# Patient Record
Sex: FEMALE | Race: BLACK OR AFRICAN AMERICAN | NOT HISPANIC OR LATINO | Employment: UNEMPLOYED | URBAN - METROPOLITAN AREA
[De-identification: names, ages, dates, MRNs, and addresses within clinical notes are randomized per-mention and may not be internally consistent; named-entity substitution may affect disease eponyms.]

---

## 2017-05-25 ENCOUNTER — ALLSCRIPTS OFFICE VISIT (OUTPATIENT)
Dept: OTHER | Facility: OTHER | Age: 32
End: 2017-05-25

## 2017-05-30 ENCOUNTER — LAB CONVERSION - ENCOUNTER (OUTPATIENT)
Dept: OTHER | Facility: OTHER | Age: 32
End: 2017-05-30

## 2017-05-30 LAB
A/G RATIO (HISTORICAL): 1.4 (CALC) (ref 1–2.5)
ALBUMIN SERPL BCP-MCNC: 4.2 G/DL (ref 3.6–5.1)
ALP SERPL-CCNC: 44 U/L (ref 33–115)
ALT SERPL W P-5'-P-CCNC: 14 U/L (ref 6–29)
AST SERPL W P-5'-P-CCNC: 16 U/L (ref 10–30)
BASOPHILS # BLD AUTO: 1.3 %
BASOPHILS # BLD AUTO: 48 CELLS/UL (ref 0–200)
BILIRUB SERPL-MCNC: 0.6 MG/DL (ref 0.2–1.2)
BUN SERPL-MCNC: 9 MG/DL (ref 7–25)
BUN/CREA RATIO (HISTORICAL): NORMAL (CALC) (ref 6–22)
CALCIUM SERPL-MCNC: 9.4 MG/DL (ref 8.6–10.2)
CHLORIDE SERPL-SCNC: 105 MMOL/L (ref 98–110)
CHOLEST SERPL-MCNC: 199 MG/DL (ref 125–200)
CHOLEST/HDLC SERPL: 2.3 (CALC)
CO2 SERPL-SCNC: 24 MMOL/L (ref 20–31)
CREAT SERPL-MCNC: 0.69 MG/DL (ref 0.5–1.1)
DEPRECATED RDW RBC AUTO: 14.1 % (ref 11–15)
EGFR AFRICAN AMERICAN (HISTORICAL): 135 ML/MIN/1.73M2
EGFR-AMERICAN CALC (HISTORICAL): 117 ML/MIN/1.73M2
EOSINOPHIL # BLD AUTO: 0.6 %
EOSINOPHIL # BLD AUTO: 22 CELLS/UL (ref 15–500)
GAMMA GLOBULIN (HISTORICAL): 2.9 G/DL (CALC) (ref 1.9–3.7)
GLUCOSE (HISTORICAL): 90 MG/DL (ref 65–99)
HCT VFR BLD AUTO: 35.5 % (ref 35–45)
HDLC SERPL-MCNC: 85 MG/DL
HGB BLD-MCNC: 11.5 G/DL (ref 11.7–15.5)
IRON SATN MFR SERPL: 16 % (CALC) (ref 11–50)
IRON SERPL-MCNC: 62 MCG/DL (ref 40–190)
LDL CHOLESTEROL (HISTORICAL): 105 MG/DL (CALC)
LYMPHOCYTES # BLD AUTO: 2105 CELLS/UL (ref 850–3900)
LYMPHOCYTES # BLD AUTO: 56.9 %
MCH RBC QN AUTO: 27.4 PG (ref 27–33)
MCHC RBC AUTO-ENTMCNC: 32.3 G/DL (ref 32–36)
MCV RBC AUTO: 84.8 FL (ref 80–100)
MONOCYTES # BLD AUTO: 181 CELLS/UL (ref 200–950)
MONOCYTES (HISTORICAL): 4.9 %
NEUTROPHILS # BLD AUTO: 1343 CELLS/UL (ref 1500–7800)
NEUTROPHILS # BLD AUTO: 36.3 %
NON-HDL-CHOL (CHOL-HDL) (HISTORICAL): 114 MG/DL (CALC)
PLATELET # BLD AUTO: 200 THOUSAND/UL (ref 140–400)
PMV BLD AUTO: 11 FL (ref 7.5–12.5)
POTASSIUM SERPL-SCNC: 3.8 MMOL/L (ref 3.5–5.3)
RBC # BLD AUTO: 4.19 MILLION/UL (ref 3.8–5.1)
SODIUM SERPL-SCNC: 140 MMOL/L (ref 135–146)
T3FREE SERPL-MCNC: 2.6 PG/ML (ref 2.3–4.2)
T4 FREE SERPL-MCNC: 0.9 NG/DL (ref 0.8–1.8)
TIBC SERPL-MCNC: 398 MCG/DL (CALC) (ref 250–450)
TOTAL PROTEIN (HISTORICAL): 7.1 G/DL (ref 6.1–8.1)
TRIGL SERPL-MCNC: 43 MG/DL
TSH SERPL DL<=0.05 MIU/L-ACNC: 1.2 MIU/L
VIT B12 SERPL-MCNC: 638 PG/ML (ref 200–1100)
WBC # BLD AUTO: 3.7 THOUSAND/UL (ref 3.8–10.8)

## 2017-06-02 ENCOUNTER — GENERIC CONVERSION - ENCOUNTER (OUTPATIENT)
Dept: OTHER | Facility: OTHER | Age: 32
End: 2017-06-02

## 2017-07-08 ENCOUNTER — HOSPITAL ENCOUNTER (EMERGENCY)
Facility: HOSPITAL | Age: 32
Discharge: HOME | End: 2017-07-08
Attending: EMERGENCY MEDICINE | Admitting: EMERGENCY MEDICINE
Payer: COMMERCIAL

## 2017-07-08 VITALS
TEMPERATURE: 97.9 F | OXYGEN SATURATION: 100 % | DIASTOLIC BLOOD PRESSURE: 80 MMHG | HEART RATE: 84 BPM | RESPIRATION RATE: 16 BRPM | SYSTOLIC BLOOD PRESSURE: 134 MMHG | WEIGHT: 143.96 LBS

## 2017-07-08 DIAGNOSIS — N39.0 URINARY TRACT INFECTION: Primary | ICD-10-CM

## 2017-07-08 LAB
CLARITY, POC: CLEAR
COLOR, POC: YELLOW
EXT BILIRUBIN, UA: NEGATIVE
EXT BLOOD URINE: NORMAL
EXT GLUCOSE, UA: NEGATIVE
EXT KETONES: NEGATIVE
EXT NITRITE, UA: NEGATIVE
EXT PH, UA: 6.5
EXT PROTEIN, UA: NEGATIVE
EXT SPECIFIC GRAVITY, UA: 1
EXT UROBILINOGEN: NEGATIVE
HCG UR QL: NEGATIVE
WBC # BLD EST: NEGATIVE 10*3/UL

## 2017-07-08 PROCEDURE — 99283 EMERGENCY DEPT VISIT LOW MDM: CPT

## 2017-07-08 PROCEDURE — 81002 URINALYSIS NONAUTO W/O SCOPE: CPT | Performed by: EMERGENCY MEDICINE

## 2017-07-08 PROCEDURE — 81025 URINE PREGNANCY TEST: CPT | Performed by: EMERGENCY MEDICINE

## 2017-07-08 RX ORDER — CEPHALEXIN 250 MG/1
500 CAPSULE ORAL ONCE
Status: COMPLETED | OUTPATIENT
Start: 2017-07-08 | End: 2017-07-08

## 2017-07-08 RX ORDER — CEPHALEXIN 500 MG/1
500 CAPSULE ORAL 4 TIMES DAILY
Qty: 28 CAPSULE | Refills: 0 | Status: SHIPPED | OUTPATIENT
Start: 2017-07-08 | End: 2017-07-15

## 2017-07-08 RX ADMIN — CEPHALEXIN 500 MG: 250 CAPSULE ORAL at 22:04

## 2017-08-29 ENCOUNTER — HOSPITAL ENCOUNTER (EMERGENCY)
Facility: HOSPITAL | Age: 32
Discharge: HOME | End: 2017-08-29
Attending: EMERGENCY MEDICINE | Admitting: EMERGENCY MEDICINE
Payer: COMMERCIAL

## 2017-08-29 VITALS
SYSTOLIC BLOOD PRESSURE: 140 MMHG | OXYGEN SATURATION: 100 % | DIASTOLIC BLOOD PRESSURE: 67 MMHG | HEART RATE: 85 BPM | WEIGHT: 145 LBS | RESPIRATION RATE: 14 BRPM | TEMPERATURE: 99.2 F

## 2017-08-29 DIAGNOSIS — N76.0 VAGINITIS: Primary | ICD-10-CM

## 2017-08-29 LAB
BACTERIA UR QL AUTO: ABNORMAL /HPF
BILIRUB UR QL STRIP: NEGATIVE
CLARITY UR: CLEAR
COLOR UR: ABNORMAL
GLUCOSE UR STRIP-MCNC: NEGATIVE MG/DL
HCG UR QL: NORMAL
HGB UR QL STRIP.AUTO: ABNORMAL
KETONES UR STRIP-MCNC: NEGATIVE MG/DL
LEUKOCYTE ESTERASE UR QL STRIP: ABNORMAL
NITRITE UR QL STRIP: NEGATIVE
NON-SQ EPI CELLS URNS QL MICRO: ABNORMAL /HPF
OTHER STN SPEC: ABNORMAL
PH UR STRIP.AUTO: 6 [PH] (ref 4.5–8)
PROT UR STRIP-MCNC: NEGATIVE MG/DL
RBC #/AREA URNS AUTO: ABNORMAL /HPF
SP GR UR STRIP.AUTO: 1.01 (ref 1–1.03)
UROBILINOGEN UR QL STRIP.AUTO: 0.2 E.U./DL
WBC #/AREA URNS AUTO: ABNORMAL /HPF

## 2017-08-29 PROCEDURE — 87086 URINE CULTURE/COLONY COUNT: CPT | Performed by: EMERGENCY MEDICINE

## 2017-08-29 PROCEDURE — 87591 N.GONORRHOEAE DNA AMP PROB: CPT | Performed by: EMERGENCY MEDICINE

## 2017-08-29 PROCEDURE — 99283 EMERGENCY DEPT VISIT LOW MDM: CPT

## 2017-08-29 PROCEDURE — 81001 URINALYSIS AUTO W/SCOPE: CPT | Performed by: EMERGENCY MEDICINE

## 2017-08-29 PROCEDURE — 87491 CHLMYD TRACH DNA AMP PROBE: CPT | Performed by: EMERGENCY MEDICINE

## 2017-08-29 PROCEDURE — 87147 CULTURE TYPE IMMUNOLOGIC: CPT | Performed by: EMERGENCY MEDICINE

## 2017-08-29 PROCEDURE — 81025 URINE PREGNANCY TEST: CPT | Performed by: EMERGENCY MEDICINE

## 2017-08-29 RX ORDER — METRONIDAZOLE 500 MG/1
500 TABLET ORAL EVERY 8 HOURS SCHEDULED
Qty: 21 TABLET | Refills: 0 | Status: SHIPPED | OUTPATIENT
Start: 2017-08-29 | End: 2017-09-05

## 2017-08-29 RX ORDER — FLUCONAZOLE 100 MG/1
200 TABLET ORAL ONCE
Status: COMPLETED | OUTPATIENT
Start: 2017-08-29 | End: 2017-08-29

## 2017-08-29 RX ORDER — FLUCONAZOLE 150 MG/1
TABLET ORAL
Qty: 7 TABLET | Refills: 0 | Status: SHIPPED | OUTPATIENT
Start: 2017-08-29 | End: 2017-10-13

## 2017-08-29 RX ADMIN — FLUCONAZOLE 200 MG: 100 TABLET ORAL at 17:07

## 2017-08-30 LAB
BACTERIA UR CULT: NORMAL
CHLAMYDIA DNA CVX QL NAA+PROBE: NORMAL
N GONORRHOEA DNA GENITAL QL NAA+PROBE: NORMAL

## 2017-09-13 ENCOUNTER — GENERIC CONVERSION - ENCOUNTER (OUTPATIENT)
Dept: OTHER | Facility: OTHER | Age: 32
End: 2017-09-13

## 2017-10-13 ENCOUNTER — HOSPITAL ENCOUNTER (EMERGENCY)
Facility: HOSPITAL | Age: 32
Discharge: HOME | End: 2017-10-13
Attending: EMERGENCY MEDICINE
Payer: COMMERCIAL

## 2017-10-13 VITALS
RESPIRATION RATE: 18 BRPM | HEART RATE: 80 BPM | SYSTOLIC BLOOD PRESSURE: 122 MMHG | OXYGEN SATURATION: 100 % | TEMPERATURE: 98.9 F | WEIGHT: 150.35 LBS | DIASTOLIC BLOOD PRESSURE: 72 MMHG

## 2017-10-13 DIAGNOSIS — B96.89 BACTERIAL VAGINOSIS: Primary | ICD-10-CM

## 2017-10-13 DIAGNOSIS — N76.0 BACTERIAL VAGINOSIS: Primary | ICD-10-CM

## 2017-10-13 LAB
CHLAMYDIA DNA CVX QL NAA+PROBE: NORMAL
CLARITY, POC: CLEAR
COLOR, POC: YELLOW
EXT BILIRUBIN, UA: NORMAL
EXT BLOOD URINE: NORMAL
EXT GLUCOSE, UA: NORMAL
EXT KETONES: NORMAL
EXT NITRITE, UA: NORMAL
EXT PH, UA: 7
EXT PREG TEST URINE: NORMAL
EXT PROTEIN, UA: NORMAL
EXT SPECIFIC GRAVITY, UA: 1.01
EXT UROBILINOGEN: NORMAL
N GONORRHOEA DNA GENITAL QL NAA+PROBE: NORMAL
WBC # BLD EST: NORMAL 10*3/UL

## 2017-10-13 PROCEDURE — 87491 CHLMYD TRACH DNA AMP PROBE: CPT | Performed by: EMERGENCY MEDICINE

## 2017-10-13 PROCEDURE — 81025 URINE PREGNANCY TEST: CPT | Performed by: EMERGENCY MEDICINE

## 2017-10-13 PROCEDURE — 99283 EMERGENCY DEPT VISIT LOW MDM: CPT

## 2017-10-13 PROCEDURE — 81002 URINALYSIS NONAUTO W/O SCOPE: CPT | Performed by: EMERGENCY MEDICINE

## 2017-10-13 PROCEDURE — 87591 N.GONORRHOEAE DNA AMP PROB: CPT | Performed by: EMERGENCY MEDICINE

## 2017-10-13 RX ORDER — METRONIDAZOLE 500 MG/1
500 TABLET ORAL 2 TIMES DAILY
Qty: 14 TABLET | Refills: 0 | Status: SHIPPED | OUTPATIENT
Start: 2017-10-13 | End: 2017-10-20

## 2017-10-13 NOTE — DISCHARGE INSTRUCTIONS
Bacterial Vaginosis   WHAT YOU NEED TO KNOW:   Bacterial vaginosis (BV) is an infection in the vagina  It may cause vaginitis, which is irritation and inflammation of the vagina  DISCHARGE INSTRUCTIONS:   Medicines:   · Antibiotics: These are given to kill the bacteria that cause BV  They may be given as a pill or a cream to put in your vagina  Take or use as directed  · Take your medicine as directed  Contact your healthcare provider if you think your medicine is not helping or if you have side effects  Tell him of her if you are allergic to any medicine  Keep a list of the medicines, vitamins, and herbs you take  Include the amounts, and when and why you take them  Bring the list or the pill bottles to follow-up visits  Carry your medicine list with you in case of an emergency  Prevent bacterial vaginosis:   · Keep your vaginal area clean and dry:  Wear underwear and pantyhose with a cotton crotch  Wipe from front to back after you urinate or have a bowel movement  After bathing, rinse soap from your vaginal area to decrease your risk for irritation  · Do not use products that cause irritation:  Always use unscented tampons or sanitary pads  Do not use feminine sprays, powders, or scented tampons because they may cause irritation and increase your risk of BV  Detergents and fabric softeners may also cause irritation  · Do not douche: This can cause an imbalance in healthy vaginal bacteria  · Use latex condoms: This helps prevent another infection and keeps your partner from getting the infection  Contact your healthcare provider if:   · Your symptoms come back or do not improve with treatment  · You have vaginal bleeding that is not your monthly period  · You have questions or concerns about your condition or care  © 2017 [facility] Information is for End User's use only and may not be sold, redistributed or otherwise used for commercial purposes   All illustrations and images included in CareNotes® are the copyrighted property of A D [employer] M [employer]  or [employer]dras Brambila  The above information is an  only  It is not intended as medical advice for individual conditions or treatments  Talk to your doctor, nurse or pharmacist before following any medical regimen to see if it is safe and effective for you

## 2017-10-13 NOTE — ED PROVIDER NOTES
History  Chief Complaint   Patient presents with    Vaginal Discharge     Pt  with vaginal discharge and pain  Pt  with history of BV  Onset two days ago     This is a 28-year-old female presents emergency department vaginal discharge and some burning  This has been over the past several days  Patient had similar symptoms approximately 1 month ago  At that time she had vaginal cultures performed and they were negative for gonorrhea and chlamydia  Patient was diagnosed with bacterial vaginosis  She is placed on metronidazole  She had good resolution of symptoms  Symptoms have returned  Patient is 3 days late for her menses  No pelvic pain  No fevers or chills  No nausea vomiting  Symptoms are moderate severity  No aggravating or alleviating factors  No new sexual partners  No radiation of symptoms  Differential diagnosis includes bacterial vaginosis, yeast infection, cervicitis, PID, pregnancy  None       Past Medical History:   Diagnosis Date    Migraine        Past Surgical History:   Procedure Laterality Date    AUGMENTATION BREAST Bilateral 2011, 2012    TONSILLECTOMY         History reviewed  No pertinent family history  I have reviewed and agree with the history as documented  Social History   Substance Use Topics    Smoking status: Never Smoker    Smokeless tobacco: Never Used    Alcohol use No        Review of Systems   Constitutional: Negative for activity change, appetite change and fever  HENT: Negative for congestion, ear pain, rhinorrhea and sore throat  Eyes: Negative for pain, discharge and redness  Respiratory: Negative for cough, shortness of breath and wheezing  Cardiovascular: Negative for chest pain and palpitations  Gastrointestinal: Negative for abdominal pain, diarrhea, nausea and vomiting  Endocrine: Negative for polyuria  Genitourinary: Positive for menstrual problem and vaginal discharge   Negative for difficulty urinating, dysuria, frequency, genital sores, hematuria, pelvic pain, urgency, vaginal bleeding and vaginal pain  Musculoskeletal: Negative for arthralgias and myalgias  Skin: Negative for color change and rash  Allergic/Immunologic: Negative for immunocompromised state  Neurological: Negative for dizziness, syncope and light-headedness  Hematological: Does not bruise/bleed easily  Psychiatric/Behavioral: Negative for confusion  Physical Exam  ED Triage Vitals [10/13/17 0722]   Temperature Pulse Respirations Blood Pressure SpO2   98 9 °F (37 2 °C) 82 18 128/75 100 %      Temp Source Heart Rate Source Patient Position - Orthostatic VS BP Location FiO2 (%)   Oral Monitor Lying Right arm --      Pain Score       2           Physical Exam   Constitutional: She is oriented to person, place, and time  She appears well-developed  No distress  HENT:   Head: Normocephalic and atraumatic  Nose: Nose normal    Eyes: Conjunctivae are normal  No scleral icterus  Neck: Normal range of motion  Neck supple  Cardiovascular: Normal rate, regular rhythm, normal heart sounds and intact distal pulses  Pulmonary/Chest: Effort normal and breath sounds normal  No stridor  No respiratory distress  She has no wheezes  Abdominal: Soft  She exhibits no distension  There is no tenderness  There is no rebound and no guarding  Genitourinary: Uterus normal  Vaginal discharge (Send vaginal discharge noted  No cervical motion tenderness  Mild tenderness on right adnexal exam however there is no fullness ) found  Genitourinary Comments: Cultures were obtained  Musculoskeletal: She exhibits no edema or deformity  Neurological: She is alert and oriented to person, place, and time  Skin: Skin is warm and dry  No rash noted  Psychiatric: She has a normal mood and affect  Thought content normal    Nursing note and vitals reviewed        ED Medications  Medications - No data to display    Diagnostic Studies  Labs Reviewed   POCT URINALYSIS DIPSTICK - Normal       Result Value Ref Range Status    Color, UA yellow   Final    Clarity, UA clear   Final    EXT Glucose, UA neg   Final    EXT Bilirubin, UA (Ref: Negative) neg   Final    EXT Ketones, UA (Ref: Negative) neg   Final    EXT Spec Grav, UA 1 015   Final    EXT Blood, UA (Ref: Negative) trace   Final    EXT pH, UA 7   Final    EXT Protein, UA (Ref: Negative) neg   Final    EXT Urobilinogen, UA (Ref: 0 2- 1 0) neg   Final    EXT Leukocytes, UA (Ref: Negative) neg   Final    EXT Nitrite, UA (Ref: Negative) neg   Final   POCT PREGNANCY, URINE - Normal    EXT PREG TEST UR (Ref: Negative) neg   Final   CHLAMYDIA /GC AMPLIFIED DNA       No orders to display       Procedures  Procedures      Phone Contacts  ED Phone Contact    ED Course  ED Course                                MDM    CritCare Time    Disposition  Final diagnoses:   Bacterial vaginosis     ED Disposition     ED Disposition Condition Comment    Discharge  Flor Teixeira discharge to home/self care  Condition at discharge: Stable        Follow-up Information     Follow up With Specialties Details Why Contact Info    Your OB/Gyn  In 1 week as scheduled         Patient's Medications   Discharge Prescriptions    METRONIDAZOLE (FLAGYL) 500 MG TABLET    Take 1 tablet by mouth 2 (two) times a day for 7 days       Start Date: 10/13/2017End Date: 10/20/2017       Order Dose: 500 mg       Quantity: 14 tablet    Refills: 0     No discharge procedures on file      ED Provider  Electronically Signed by       Fernanda Buchanan MD  10/13/17 0540       Fernanda Buchanan MD  10/13/17 0207

## 2017-10-27 ENCOUNTER — APPOINTMENT (EMERGENCY)
Dept: RADIOLOGY | Facility: HOSPITAL | Age: 32
End: 2017-10-27
Payer: COMMERCIAL

## 2017-10-27 ENCOUNTER — HOSPITAL ENCOUNTER (EMERGENCY)
Facility: HOSPITAL | Age: 32
Discharge: HOME | End: 2017-10-27
Attending: EMERGENCY MEDICINE | Admitting: EMERGENCY MEDICINE
Payer: COMMERCIAL

## 2017-10-27 VITALS
BODY MASS INDEX: 21.43 KG/M2 | OXYGEN SATURATION: 100 % | WEIGHT: 149.69 LBS | TEMPERATURE: 98.4 F | SYSTOLIC BLOOD PRESSURE: 131 MMHG | RESPIRATION RATE: 16 BRPM | DIASTOLIC BLOOD PRESSURE: 81 MMHG | HEART RATE: 60 BPM | HEIGHT: 70 IN

## 2017-10-27 DIAGNOSIS — R07.89 ATYPICAL CHEST PAIN: Primary | ICD-10-CM

## 2017-10-27 PROCEDURE — 99285 EMERGENCY DEPT VISIT HI MDM: CPT

## 2017-10-27 PROCEDURE — 71020 HB CHEST X-RAY 2VW FRONTAL&LATL: CPT

## 2017-10-27 PROCEDURE — 93005 ELECTROCARDIOGRAM TRACING: CPT | Performed by: PHYSICIAN ASSISTANT

## 2017-10-28 NOTE — ED NOTES
Pt stated "the pain in her chest is worse when she inhales at times"       [place]tf [place], JORGE  10/27/17 2123

## 2017-10-28 NOTE — ED NOTES
Pt stated "the pain started with a headache then she started to have LUQ pain which radiated up to her chest and back"        Matt Bee RN  10/27/17 2019

## 2017-10-28 NOTE — DISCHARGE INSTRUCTIONS
Noncardiac Chest Pain   WHAT YOU NEED TO KNOW:   Noncardiac chest pain is pain or discomfort in your chest not caused by a heart problem  It may be caused by acid reflux, nerve or muscle problems within your esophagus, or chest wall, muscle, or rib pain  It may also be caused by panic attacks, anxiety, or depression  DISCHARGE INSTRUCTIONS:   Return to the emergency department if:   · You have severe chest pain  Contact your healthcare provider if:   · Your chest pain does not get better, even with treatment  · You have questions or concerns about your condition or care  Medicines:   · Medicines  may be given to treat the cause of your chest pain  You may be given medicines to decrease pain, relieve anxiety, decrease acid reflux, or relax muscles in your esophagus  · Take your medicine as directed  Contact your healthcare provider if you think your medicine is not helping or if you have side effects  Tell him of her if you are allergic to any medicine  Keep a list of the medicines, vitamins, and herbs you take  Include the amounts, and when and why you take them  Bring the list or the pill bottles to follow-up visits  Carry your medicine list with you in case of an emergency  Cognitive therapy:  Cognitive therapy may be helpful if you have panic attacks, anxiety, or depression  It can help you change how you react to situations that tend to trigger your chest pain  Follow up with your healthcare provider as directed:  Write down your questions so you remember to ask them during your visits  © 2017 [address] Information is for End User's use only and may not be sold, redistributed or otherwise used for commercial purposes  All illustrations and images included in CareNotes® are the copyrighted property of A [employer] A M [employer]  or Esdras Brambila  The above information is an  only  It is not intended as medical advice for individual conditions or treatments   Talk to your doctor, nurse or pharmacist before following any medical regimen to see if it is safe and effective for you

## 2017-10-28 NOTE — ED PROVIDER NOTES
History  Chief Complaint   Patient presents with    Chest Pain     Pt presents to ER w/ c/o Left sided c/p approx 1 hr  Pt denied cardiac hx  Pt stated pain radiates into back  Pt confirmed n  Pt denied addtl symptoms     71-year-old female presents to emergency room for evaluation of left-sided chest pain  Onset around 5:00 p m  while watching TV  Pain is described as sharp worse with inspiration and radiates to the left side of her back  Denies shortness of breath  Denies sweating or nausea  Denies shoulder or arm pain  Denies neck pain  Denies drug abuse  Denies history of high blood pressure cholesterol or diabetes  Denies significant family history of cardiac disease  Denies recent travel, surgery or use of oral contraceptives  Denies swelling of her lower legs  Patient admits to recently starting to work out again denies lifting weights states she is just doing cardio  Patient states pain does not feel muscular  Denies recent cough cold or fever  Patient admits to typical migraine earlier in the day for which she took Excedrin and it helped  History provided by:  Patient      None       Past Medical History:   Diagnosis Date    Migraine        Past Surgical History:   Procedure Laterality Date    AUGMENTATION BREAST Bilateral 2011, 2012    TONSILLECTOMY         History reviewed  No pertinent family history  I have reviewed and agree with the history as documented  Social History   Substance Use Topics    Smoking status: Never Smoker    Smokeless tobacco: Never Used    Alcohol use No        Review of Systems   Constitutional: Negative for chills, diaphoresis and fever  Respiratory: Negative for cough  Cardiovascular: Positive for chest pain  Negative for palpitations and leg swelling  Gastrointestinal: Negative for abdominal pain and vomiting  Musculoskeletal: Positive for back pain  Negative for neck pain  Skin: Negative for rash and wound     Neurological: Negative for dizziness, syncope, weakness and numbness  Physical Exam  ED Triage Vitals [10/27/17 2003]   Temperature Pulse Respirations Blood Pressure SpO2   98 4 °F (36 9 °C) 72 16 146/89 100 %      Temp Source Heart Rate Source Patient Position - Orthostatic VS BP Location FiO2 (%)   Oral Monitor Sitting Right arm --      Pain Score       4           Orthostatic Vital Signs  Vitals:    10/27/17 2003 10/27/17 2121   BP: 146/89 140/92   Pulse: 72 72   Patient Position - Orthostatic VS: Sitting Lying       Physical Exam   Constitutional: She appears well-developed and well-nourished  Eyes: Conjunctivae are normal    Neck: Neck supple  Cardiovascular: Normal rate, regular rhythm and normal heart sounds  Pulmonary/Chest: Effort normal and breath sounds normal    Abdominal: Soft  Bowel sounds are normal  She exhibits no distension  There is no tenderness  There is no CVA tenderness  Musculoskeletal: She exhibits no edema or tenderness  Neurological: She is alert  Skin: Skin is warm and dry  No rash noted  Psychiatric: She has a normal mood and affect  Nursing note and vitals reviewed        ED Medications  Medications - No data to display    Diagnostic Studies  Results Reviewed     None                 XR chest 2 views   ED Interpretation by Sreekanth Galindo PA-C (10/27 2134)   No acute disease                 Procedures  ECG 12 Lead Documentation  Date/Time: 10/27/2017 9:42 PM  Performed by: Monica Krishnan by: JIMMY DUCKWORTH     Indications / Diagnosis:  Chest pain  ECG reviewed by me, the ED Provider: yes    Patient location:  ED  Interpretation:     Interpretation: normal    Rate:     ECG rate:  84    ECG rate assessment: normal    Rhythm:     Rhythm: sinus rhythm      Rhythm comment:  With Sinus arrhythmia   Ectopy:     Ectopy: none    QRS:     QRS axis:  Normal  Conduction:     Conduction: abnormal      Abnormal conduction: incomplete RBBB    ST segments:     ST segments:  Normal  T waves:     T waves: normal             Phone Contacts  ED Phone Contact    ED Course  ED Course                PERC Rule for PE    Flowsheet Row Most Recent Value   PERC Rule for PE   Age >=50  0 Filed at: 10/27/2017 2104   HR >=100  0 Filed at: 10/27/2017 2104   O2 Sat on room air < 95%  0 Filed at: 10/27/2017 2104   History of PE or DVT  0 Filed at: 10/27/2017 2104   Recent trauma or surgery  0 Filed at: 10/27/2017 2104   Hemoptysis  0 Filed at: 10/27/2017 2104   Exogenous estrogen  0 Filed at: 10/27/2017 2104   Unilateral leg swelling  0 Filed at: 10/27/2017 2104   PERC Rule for PE Results  0 Filed at: 10/27/2017 2104                Wells' Criteria for PE    Flowsheet Row Most Recent Value   Wells' Criteria for PE   Clinical signs and symptoms of DVT  0 Filed at: 10/27/2017 2104   PE is primary diagnosis or equally likely  0 Filed at: 10/27/2017 2104   HR >100  0 Filed at: 10/27/2017 2104   Immobilization at least 3 days or Surgery in the previous 4 weeks  0 Filed at: 10/27/2017 2104   Previous, objectively diagnosed PE or DVT  0 Filed at: 10/27/2017 2104   Hemoptysis  0 Filed at: 10/27/2017 2104   Malignancy with treatment within 6 months or palliative  0 Filed at: 10/27/2017 2104   Wells' Criteria Total  0 Filed at: 10/27/2017 2104            [facility]  Number of Diagnoses or Management Options  Atypical chest pain: new and requires workup     Amount and/or Complexity of Data Reviewed  Tests in the radiology section of CPT®: ordered and reviewed    Risk of Complications, Morbidity, and/or Mortality  Presenting problems: high  General comments: Differential diagnosis includes but is not limited to: arrhythmia, pnx, muscular, anxiety, GI, shingles, very low risk PE    Patient Progress  Patient progress: stable    CritCare Time    Disposition  Final diagnoses:   Atypical chest pain     Time reflects when diagnosis was documented in both MDM as applicable and the Disposition within this note     Time User Action Codes Description Comment    10/27/2017  9:36 PM Lisseth Florian Add [R07 [id number]] Atypical chest pain       ED Disposition     ED Disposition Condition Comment    Discharge  Cloria Grills discharge to home/self care  Condition at discharge: Good        Follow-up Information     Follow up With Specialties Details Why Contact Info Additional Dev 18, DO Family Medicine In 3 days  [address] [facility] [id number]  [address] [facility] Emergency Department Emergency Medicine  If symptoms worsen [address] [address]  [phone] [facility], [address], [place], [place], [address]        Patient's Medications    No medications on file     No discharge procedures on file      ED Provider  Electronically Signed by           Ela Cantor PA-C  10/27/17 8163

## 2017-10-30 LAB
ATRIAL RATE: 84 BPM
P AXIS: 75 DEGREES
PR INTERVAL: 138 MS
QRS AXIS: 11 DEGREES
QRSD INTERVAL: 116 MS
QT INTERVAL: 392 MS
QTC INTERVAL: 463 MS
T WAVE AXIS: 40 DEGREES
VENTRICULAR RATE: 84 BPM

## 2018-01-11 NOTE — RESULT NOTES
Discussion/Summary   Piper,   Please follow up with a gynecologist regarding your mild anemia  This could be related to your cervical cancer  The earlier you treat this the less likely it will get worse and need even more aggressive treatment that it needs now  Dr Kelby Najjar      Verified Results  (1) CBC/PLT/DIFF [id number] 11:08AM [employer]     Test Name Result Flag Reference   WHITE BLOOD CELL COUNT 3 7 Thousand/uL L 3 8-10 8   RED BLOOD CELL COUNT 4 19 Million/uL  3 80-5 10   HEMOGLOBIN 11 5 g/dL L 11 7-15 5   HEMATOCRIT 35 5 %  35 0-45 0   MCV 84 8 fL  80 0-100 0   MCH 27 4 pg  27 0-33 0   MCHC 32 3 g/dL  32 0-36 0   RDW 14 1 %  11 0-15 0   PLATELET COUNT 951 Thousand/uL  140-400   ABSOLUTE NEUTROPHILS 1343 cells/uL L 6490-6567   ABSOLUTE LYMPHOCYTES 2105 cells/uL  850-3900   ABSOLUTE MONOCYTES 181 cells/uL L 200-950   ABSOLUTE EOSINOPHILS 22 cells/uL     ABSOLUTE BASOPHILS 48 cells/uL  0-200   NEUTROPHILS 36 3 %     LYMPHOCYTES 56 9 %     MONOCYTES 4 9 %     EOSINOPHILS 0 6 %     BASOPHILS 1 3 %     MPV 11 0 fL  7 5-12 5     (1) COMPREHENSIVE METABOLIC PANEL [id number] 40:21KX [employer]     Test Name Result Flag Reference   GLUCOSE 90 mg/dL  65-99   Fasting reference interval   UREA NITROGEN (BUN) 9 mg/dL  7-25   CREATININE 0 69 mg/dL  0 50-1 10   eGFR NON-AFR   AMERICAN 117 mL/min/1 73m2  > OR = 60   eGFR AFRICAN AMERICAN 135 mL/min/1 73m2  > OR = 60   BUN/CREATININE RATIO   2-66   NOT APPLICABLE (calc)   SODIUM 140 mmol/L  135-146   POTASSIUM 3 8 mmol/L  3 5-5 3   CHLORIDE 105 mmol/L     CARBON DIOXIDE 24 mmol/L  20-31   CALCIUM 9 4 mg/dL  8 6-10 2   PROTEIN, TOTAL 7 1 g/dL  6 1-8 1   ALBUMIN 4 2 g/dL  3 6-5 1   GLOBULIN 2 9 g/dL (calc)  1 9-3 7   ALBUMIN/GLOBULIN RATIO 1 4 (calc)  1 0-2 5   BILIRUBIN, TOTAL 0 6 mg/dL  0 2-1 2   ALKALINE PHOSPHATASE 44 U/L     AST 16 U/L  10-30   ALT 14 U/L  6-29     (1) T4, FREE [id number] 11:08AM Nahun Morrissey     Test Name Result Flag Reference T4, FREE 0 9 ng/dL  0 8-1 8     (1) FREE T3 [id number] 11:08AM Tamiko Dill   REPORT COMMENT:  FASTING:YES     Test Name Result Flag Reference   T3, FREE 2 6 pg/mL  2 3-4 2     (1) VITAMIN B12 [id number] 11:08AM Tamiko Dill     Test Name Result Flag Reference   VITAMIN B12 638 pg/mL  200-1100     (Q) LIPID PANEL WITH REFLEX TO DIRECT LDL [id number] 11:08AM Tamiko Dill     Test Name Result Flag Reference   CHOLESTEROL, TOTAL 199 mg/dL  125-200   HDL CHOLESTEROL 85 mg/dL  > OR = 46   TRIGLICERIDES 43 mg/dL  <387   LDL-CHOLESTEROL 105 mg/dL (calc)  <130   Desirable range <100 mg/dL for patients with CHD or  diabetes and <70 mg/dL for diabetic patients with  known heart disease  CHOL/HDLC RATIO 2 3 (calc)  < OR = 5 0   NON HDL CHOLESTEROL 114 mg/dL (calc)     Target for non-HDL cholesterol is 30 mg/dL higher than   LDL cholesterol target       (1) IRON SATURATION %, TIBC [id number] 11:08AM Tamiko Dill     Test Name Result Flag Reference   IRON, TOTAL 62 mcg/dL     IRON BINDING CAPACITY 398 mcg/dL (calc)  250-450   % SATURATION 16 % (calc)  11-50     (Q) TSH, 3RD GENERATION [id number] 11:08AM Tamiko Dill     Test Name Result Flag Reference   TSH 1 20 mIU/L     Reference Range                         > or = 20 Years  0 40-4 50                              Pregnancy Ranges            First trimester    0 26-2 66            Second trimester   0 55-2 73            Third trimester    0 43-2 91

## 2018-01-13 VITALS
SYSTOLIC BLOOD PRESSURE: 124 MMHG | HEART RATE: 84 BPM | TEMPERATURE: 98.3 F | DIASTOLIC BLOOD PRESSURE: 76 MMHG | WEIGHT: 148 LBS | HEIGHT: 69 IN | BODY MASS INDEX: 21.92 KG/M2 | RESPIRATION RATE: 12 BRPM

## 2018-01-13 NOTE — PROGRESS NOTES
Assessment    1  Acute upper respiratory infection (465 9) (J06 9)    Plan  Acute upper respiratory infection    · Drink plenty of fluids ; Status:Complete;   Done: 31MEL2448   · Gargle with warm salt water for 5 minutes every 4 hours ; Status:Complete;   Done:  65TGE0663   · Irrigate your nose twice a day ; Status:Complete;   Done: 65QSX7549    Discussion/Summary    Acute URI- Discussed supportive care of viral illness  Recommended Robitussin for cough  F/u if not better or as needed  Possible side effects of new medications were reviewed with the patient/guardian today  The treatment plan was reviewed with the patient/guardian  The patient/guardian understands and agrees with the treatment plan      Chief Complaint    1  Cold Symptoms  Started yesterday with body aches, cough and congestion  Had the sweats last pm and took Tylenol  No known fever JMoyleLPN      History of Present Illness  HPI: Pt has had a dry cough for the past few days  C/o body aches and chills that started yesterday  She has not had a fever  Denies sinus congestion, sore throat, SOB  She has been taking Dayquil and Nyquil which helps a little  Cold Symptoms:   Associated symptoms include productive cough and fatigue, but no nasal congestion, no runny nose, no sore throat, no facial pressure, no facial pain, no plugged ear(s), no ear pain, no fever and no chills  Review of Systems    Constitutional: feeling poorly and chills, but no fever  ENT: as noted in HPI  Cardiovascular: no chest pain and no palpitations  Respiratory: cough, but no shortness of breath and no wheezing  Gastrointestinal: no abdominal pain, no nausea, no vomiting and no diarrhea  Musculoskeletal: arthralgias and myalgias  Neurological: no headache  Active Problems    1  Abnormal vaginal bleeding (623 8) (N93 9)   2  Anemia (285 9) (D64 9)   3  Anxiety disorder (300 00) (F41 9)   4  Depression, major (296 20) (F32 9)   5   Fatigue (780 79) (R53 83)   6  Hyperthyroidism (242 90) (E05 90)   7  Irritable bowel syndrome (564 1) (K58 9)   8  Migraine headache (346 90) (G43 909)   9  Need for immunization against influenza (V04 81) (Z23)   10  Numbness and tingling sensation of skin (782 0) (R20 2)   11  Oral contraceptive use (V25 41) (Z30 41)   12  Suicidal ideation (V62 84) (R45 851)   13  Thyroid nodule (241 0) (E04 1)   14  Well adult on routine health check (V70 0) (Z00 00)    Past Medical History    1  History of Abdominal pain, RUQ (789 01) (R10 11)   2  History of Cellulitis of leg, except foot (682 6) (L03 119)   3  History of acute sinusitis (V12 69) (Z87 09)   4  History of urinary tract infection (V13 02) (Z87 440)   5  History of Palpitations (785 1) (R00 2)  Active Problems And Past Medical History Reviewed: The active problems and past medical history were reviewed and updated today  Family History    1  Family history of Hypertension (V17 49)    2  Family history of Systemic Lupus Erythematosus    Social History    · Never a smoker   · Oral contraceptive use (V25 41) (Z30 41)  The social history was reviewed and is unchanged  Surgical History    1  History of Breast Surgery Enlargement Procedure   2  History of Tonsillectomy With Adenoidectomy    Current Meds   1  Femcon Fe 0 4-35 MG-MCG Oral Tablet Chewable; 1 every day Recorded    The medication list was reviewed and updated today  Allergies    1  SUMAtriptan Succinate TABS    Vitals   Recorded: 32PNS7574 12:01PM   Temperature 98 8 F   Heart Rate 72   Respiration 16   Systolic 912   Diastolic 70   Height 5 ft 9 in   Weight 160 lb    BMI Calculated 23 63   BSA Calculated 1 88   LMP 02-Jan-2016     Physical Exam    Constitutional   General appearance: No acute distress, well appearing and well nourished  Eyes   Conjunctiva and lids: No swelling, erythema or discharge      Ears, Nose, Mouth, and Throat   Otoscopic examination: Tympanic membranes translucent with normal light reflex  Canals patent without erythema  Nasal mucosa, septum, and turbinates: Normal without edema or erythema  Oropharynx: Normal with no erythema, edema, exudate or lesions  Pulmonary   Respiratory effort: No increased work of breathing or signs of respiratory distress  Auscultation of lungs: Clear to auscultation  Cardiovascular   Auscultation of heart: Normal rate and rhythm, normal S1 and S2, without murmurs  Examination of extremities for edema and/or varicosities: Normal     Lymphatic   Palpation of lymph nodes in neck: Abnormal   bilateral anterior cervical node enlargement  Skin   Skin and subcutaneous tissue: Normal without rashes or lesions  Psychiatric   Mood and affect: Normal          Attending Note  Collaborating Physician Note: Collaborating Note: I agree with the Advanced Practitioner note        Signatures   Electronically signed by : Esther Smith; Jan 16 2016 12:22PM EST                       (Author)    Electronically signed by : Aneudy Cardenas DO; Jan 16 2016  8:41PM EST                       (Author)

## 2018-01-13 NOTE — PROGRESS NOTES
Assessment    1  Encounter for preventive health examination (V70 0) (Z00 00)   2  Screening for cardiovascular condition (V81 2) (Z13 6)    Plan  Anemia    · (1) CBC/PLT/DIFF; Status:Active; Requested for:79Ncv4523; Health Maintenance    · Begin a limited exercise program ; Status:Complete;   Done: 56RBD8330  Hyperthyroidism    · (1) FREE T3; Status:Active; Requested for:31Vdk0831;    · (1) T4, FREE; Status:Active; Requested for:60Ysj6365;    · (1) TSH; Status:Active; Requested for:37Jfl6259;   Screening for cardiovascular condition    · (1) COMPREHENSIVE METABOLIC PANEL; Status:Active; Requested for:49Iou8121;    · (1) LIPID PANEL FASTING W DIRECT LDL REFLEX; Status:Active; Requested  for:89Yvj6835;     Discussion/Summary  health maintenance visit healthy adult female Currently, she eats a healthy diet and has an inadequate exercise regimen  cervical cancer screening is current Has a gyn in the / [place] [facility]  Breast cancer screening: breast cancer screening is not indicated  Chief Complaint  Seen for for a CPE  states just had a Colposcopy due to recent abnormal Pap Smear  er/cma  History of Present Illness  HM, Adult Female: The patient is being seen for a health maintenance evaluation  General Health:   Lifestyle:  She consumes a diverse and healthy diet  She does not have any weight concerns  She does not exercise regularly  She does not use tobacco    Screening:   HPI: She had a colposcopy done last week  It was very traumatic  She has been having a hard time with her depression for 4 days  She is feeling better now  She is paying attention  Review of Systems    Constitutional: feeling tired  Eyes: No complaints of eye pain, no red eyes, no eyesight problems, no discharge, no dry eyes, no itching of eyes  ENT: no complaints of earache, no loss of hearing, no nose bleeds, no nasal discharge, no sore throat, no hoarseness     Cardiovascular: No complaints of slow heart rate, no fast heart rate, no chest pain, no palpitations, no leg claudication, no lower extremity edema  Respiratory: No complaints of shortness of breath, no wheezing, no cough, no SOB on exertion, no orthopnea, no PND  Gastrointestinal: No complaints of abdominal pain, no constipation, no nausea or vomiting, no diarrhea, no bloody stools  Active Problems    1  Anemia (285 9) (D64 9)   2  Anxiety disorder (300 00) (F41 9)   3  Body mass index (BMI) of 22 0 to 22 9 in adult (V85 1) (Z68 22)   4  Depression, major (296 20) (F32 9)   5  Fatigue (780 79) (R53 83)   6  Hyperthyroidism (242 90) (E05 90)   7  Irritable bowel syndrome (564 1) (K58 9)   8  Migraine headache (346 90) (G43 909)   9  Oral contraceptive use (V25 41) (Z30 41)   10  Thyroid nodule (241 0) (E04 1)   11  Well adult on routine health check (V70 0) (Z00 00)    Past Medical History    · History of Abdominal pain, epigastric (789 06) (R10 13)   · History of Abdominal pain, RUQ (789 01) (R10 11)   · History of Abnormal vaginal bleeding (623 8) (N93 9)   · Acute upper respiratory infection (465 9) (J06 9)   · History of Acute UTI (599 0) (N39 0)   · History of Cellulitis of leg, except foot (682 6) (L03 119)   · History of Dysuria (788 1) (R30 0)   · History of acute sinusitis (V12 69) (Z87 09)   · History of urinary tract infection (V13 02) (Z87 440)   · History of Need for immunization against influenza (V04 81) (Z23)   · History of Numbness and tingling sensation of skin (782 0) (R20 0,R20 2)   · History of Palpitations (785 1) (R00 2)   · History of Suicidal ideation (V62 84) (R45 851)    Surgical History    · History of Breast Surgery Enlargement Procedure   · History of Tonsillectomy With Adenoidectomy    Family History  Father    · Family history of Hypertension (V17 49)  Sister    · Family history of Systemic Lupus Erythematosus    Social History    · Never a smoker   · Oral contraceptive use (V25 41) (Z30 41)    Current Meds   1   Femcon Fe 0 4-35 MG-MCG Oral Tablet Chewable; 1 every day Recorded    Allergies    1  SUMAtriptan Succinate TABS    Vitals   Recorded: 44Xbh8926 09:22AM   Temperature 96 9 F   Heart Rate 80   Respiration 16   Systolic 609 mm Hg   Diastolic 80 mm Hg   Height 5 ft 9 in   Weight 149 lb 2 08 oz   BMI Calculated 22 02 kg/m2   BSA Calculated 1 82 m2   LMP 12-Dec-2016     Physical Exam    Constitutional   General appearance: No acute distress, well appearing and well nourished  Ears, Nose, Mouth, and Throat   External inspection of ears and nose: Normal     Otoscopic examination: Tympanic membranes translucent with normal light reflex  Canals patent without erythema  Oropharynx: Normal with no erythema, edema, exudate or lesions  Neck   Neck: Supple, symmetric, trachea midline, no masses  Pulmonary   Auscultation of lungs: Clear to auscultation  Cardiovascular   Auscultation of heart: Normal rate and rhythm, normal S1 and S2, no murmurs  Abdomen   Abdomen: Non-tender, no masses  Liver and spleen: No hepatomegaly or splenomegaly  Lymphatic   Palpation of lymph nodes in neck: No lymphadenopathy  Musculoskeletal   Gait and station: Normal     Neurologic   Coordination: Normal finger to nose and heel to shin  Psychiatric   Mood and affect: Normal        Procedure    Procedure: Visual Acuity Test    Indication: routine screening  Inforrmation supplied by a Snellen chart     Results: 20/25 in both eyes with corrective device, 20/20 in the right eye with corrective device, 20/25 in the left eye with corrective device      Signatures   Electronically signed by : Felipa Piedra DO; Dec 15 2016  9:45AM EST                       (Author)

## 2018-01-16 NOTE — RESULT NOTES
Verified Results  (1) URINE CULTURE [id number] 06:00PM Monica Fagan     Test Name Result Flag Reference   CULTURE, URINE, ROUTINE  A    CULTURE, URINE, ROUTINE         MICRO NUMBER:      [id number]    TEST STATUS:       FINAL    SPECIMEN SOURCE:   URINE    SPECIMEN QUALITY:  ADEQUATE    RESULT:            10,000-50,000 CFU/mL of Escherichia coli                            E coli                            ----------------                            INT   HECTOR     AMOX/CLAVULANATE       S     4 0     AMPICILLIN             S     8 0     AMP/SULBACTAM          S     4 0     CEFAZOLIN              NR    <=4 0 **1     CEFEPIME               S     <=1 0     CEFTRIAXONE            S     <=1 0     CIPROFLOXACIN          S     <=0 25     ERTAPENEM              S     <=0 5     GENTAMICIN             S     <=1 0     IMIPENEM               S     <=0 25     LEVOFLOXACIN           S     <=0 12     NITROFURANTOIN         S     <=16 0     PIP/TAZOBACTAM         S     <=4 0     TOBRAMYCIN             S     <=1 0     TRIMETHOPRIM/SULFA     S     <=20 0  S=Susceptible  I=Intermediate  R=Resistant  * = Not Tested  NR = Not Reported  **NN = See Therapy Comments  THERAPY COMMENTS      Note 1:      ORAL therapy: A cefazolin HECTOR of < 32 predicts      susceptibility to the oral agents cefaclor,      cefdinir, cefpodoxime, cefprozil, cefuroxime,      cephalexin, and loracarbef when used for therapy      of uncomplicated UTIs due to E  coli,      K  pneumoniae, and P  mirabilis  PARENTERAL therapy: A cefazolin HECTOR of > 8      indicates resistance to parenteral cefazolin  An alternate test method must be performed to      to confirm susceptibility to parenteral cefazolin  NO COLLECTION DATE RECEIVED  WE HAVE USED  THE DATE THE SPECIMEN WAS RECEIVED BY THIS  LABORATORY AS THE COLLECTION DATE  IF THIS  IS INCORRECT, PLEASE CONTACT CLIENT SERVICES    PHONE NUMBER: [phone]       Signatures   Electronically signed by : [address] [facility] [facility] [address] [place];  Aug 19 2016  8:41AM EST                       (Author)

## 2018-01-22 VITALS
SYSTOLIC BLOOD PRESSURE: 122 MMHG | DIASTOLIC BLOOD PRESSURE: 78 MMHG | TEMPERATURE: 98 F | WEIGHT: 147 LBS | RESPIRATION RATE: 16 BRPM | HEART RATE: 78 BPM | BODY MASS INDEX: 21.77 KG/M2 | HEIGHT: 69 IN

## 2018-08-17 ENCOUNTER — HOSPITAL ENCOUNTER (EMERGENCY)
Facility: HOSPITAL | Age: 33
Discharge: HOME | End: 2018-08-17
Attending: EMERGENCY MEDICINE | Admitting: EMERGENCY MEDICINE
Payer: COMMERCIAL

## 2018-08-17 VITALS
DIASTOLIC BLOOD PRESSURE: 74 MMHG | OXYGEN SATURATION: 100 % | BODY MASS INDEX: 21.86 KG/M2 | RESPIRATION RATE: 18 BRPM | WEIGHT: 152.34 LBS | SYSTOLIC BLOOD PRESSURE: 138 MMHG | HEART RATE: 74 BPM | TEMPERATURE: 98.5 F

## 2018-08-17 DIAGNOSIS — N76.0 BACTERIAL VAGINOSIS: Primary | ICD-10-CM

## 2018-08-17 DIAGNOSIS — B96.89 BACTERIAL VAGINOSIS: Primary | ICD-10-CM

## 2018-08-17 LAB
BILIRUB UR QL STRIP: NEGATIVE
CHLAMYDIA DNA CVX QL NAA+PROBE: NORMAL
CLARITY UR: CLEAR
COLOR UR: YELLOW
EXT PREG TEST URINE: NEGATIVE
GLUCOSE UR STRIP-MCNC: NEGATIVE MG/DL
HGB UR QL STRIP.AUTO: NEGATIVE
KETONES UR STRIP-MCNC: ABNORMAL MG/DL
LEUKOCYTE ESTERASE UR QL STRIP: NEGATIVE
N GONORRHOEA DNA GENITAL QL NAA+PROBE: NORMAL
NITRITE UR QL STRIP: NEGATIVE
PH UR STRIP.AUTO: 7 [PH] (ref 4.5–8)
PROT UR STRIP-MCNC: NEGATIVE MG/DL
SP GR UR STRIP.AUTO: 1.01 (ref 1–1.03)
UROBILINOGEN UR QL STRIP.AUTO: 0.2 E.U./DL

## 2018-08-17 PROCEDURE — 81025 URINE PREGNANCY TEST: CPT | Performed by: PHYSICIAN ASSISTANT

## 2018-08-17 PROCEDURE — 99283 EMERGENCY DEPT VISIT LOW MDM: CPT

## 2018-08-17 PROCEDURE — 87491 CHLMYD TRACH DNA AMP PROBE: CPT | Performed by: PHYSICIAN ASSISTANT

## 2018-08-17 PROCEDURE — 87591 N.GONORRHOEAE DNA AMP PROB: CPT | Performed by: PHYSICIAN ASSISTANT

## 2018-08-17 PROCEDURE — 81003 URINALYSIS AUTO W/O SCOPE: CPT

## 2018-08-17 RX ORDER — METRONIDAZOLE 500 MG/1
500 TABLET ORAL EVERY 12 HOURS SCHEDULED
Qty: 14 TABLET | Refills: 0 | Status: SHIPPED | OUTPATIENT
Start: 2018-08-17 | End: 2018-08-24

## 2018-08-17 NOTE — ED PROVIDER NOTES
History  Chief Complaint   Patient presents with    Vaginal Discharge     PT reports "I think I have BV, Its itchy, I have discharge, I had a gyn, but they dont take my ins  anymore "     This is a 50-year-old female patient with history of intermittent bacterial vaginosis  She states she used to have a gynecologist at which treat this on occasion but her charts ran out  She states last 5 days she has had some discharge and some vaginal irritation  No abdominal pain no bleeding no fever no chills no headache blurred vision double vision no cough congestion sore throat nausea vomiting diarrhea abdominal pain nothing makes it better or worse  She states that she has a clearish white discharge unsure of has a foul odor  States it feels like her previous BV  Patient had pelvic exam with cultures  No other complaints            None       Past Medical History:   Diagnosis Date    Migraine        Past Surgical History:   Procedure Laterality Date    AUGMENTATION BREAST Bilateral 2011, 2012    BREAST IMPLANT Bilateral     TONSILLECTOMY         Family History   Problem Relation Age of Onset    Lupus Sister      I have reviewed and agree with the history as documented  Social History   Substance Use Topics    Smoking status: Never Smoker    Smokeless tobacco: Never Used    Alcohol use Yes      Comment: socially        Review of Systems   All other systems reviewed and are negative  Physical Exam  Physical Exam   Constitutional: She appears well-developed and well-nourished  HENT:   Head: Normocephalic and atraumatic  Right Ear: External ear normal    Left Ear: External ear normal    Nose: Nose normal    Mouth/Throat: Oropharynx is clear and moist    Eyes: Conjunctivae are normal  Pupils are equal, round, and reactive to light  Neck: Normal range of motion  Neck supple  Cardiovascular: Normal rate and regular rhythm      Pulmonary/Chest: Effort normal and breath sounds normal    Abdominal: Soft  Bowel sounds are normal  There is no tenderness  Hernia confirmed negative in the right inguinal area and confirmed negative in the left inguinal area  Genitourinary: Rectum normal  Pelvic exam was performed with patient supine  No labial fusion  There is no rash, tenderness, lesion or injury on the right labia  There is no rash, tenderness, lesion or injury on the left labia  Cervix exhibits no motion tenderness and no friability  Right adnexum displays no tenderness and no fullness  Left adnexum displays no tenderness and no fullness  No tenderness in the vagina  No signs of injury around the vagina  Vaginal discharge found  Lymphadenopathy:        Right: No inguinal adenopathy present  Left: No inguinal adenopathy present  Neurological: She is alert  Skin: Skin is warm  Psychiatric: She has a normal mood and affect  Her behavior is normal    Nursing note and vitals reviewed        Vital Signs  ED Triage Vitals   Temperature Pulse Respirations Blood Pressure SpO2   08/17/18 1202 08/17/18 1200 08/17/18 1200 08/17/18 1200 08/17/18 1200   98 5 °F (36 9 °C) 72 16 138/74 100 %      Temp Source Heart Rate Source Patient Position - Orthostatic VS BP Location FiO2 (%)   08/17/18 1201 08/17/18 1200 08/17/18 1200 08/17/18 1200 --   Oral Monitor Sitting Right arm       Pain Score       --                  Vitals:    08/17/18 1200 08/17/18 1201   BP: 138/74 138/74   Pulse: 72 74   Patient Position - Orthostatic VS: Sitting Lying       Visual Acuity      ED Medications  Medications - No data to display    Diagnostic Studies  Results Reviewed     Procedure Component Value Units Date/Time    Chlamydia/GC amplified DNA by PCR [[id number]] Collected:  08/17/18 1251    Lab Status:  No result Specimen:  Genital from Cervix     POCT pregnancy, urine [[id number]]  (Normal) Resulted:  08/17/18 1250    Lab Status:  Final result Updated:  08/17/18 1250     EXT PREG TEST UR (Ref: Negative) NEGATIVE    ED Urine Macroscopic [[id number]]  (Abnormal) Collected:  08/17/18 1259    Lab Status:  Final result Specimen:  Urine Updated:  08/17/18 1250     Color, UA Yellow     Clarity, UA Clear     pH, UA 7 0     Leukocytes, UA Negative     Nitrite, UA Negative     Protein, UA Negative mg/dl      Glucose, UA Negative mg/dl      Ketones, UA Trace (A) mg/dl      Urobilinogen, UA 0 2 E U /dl      Bilirubin, UA Negative     Blood, UA Negative     Specific Gravity, UA 1 015    Narrative:       CLINITEK RESULT                 No orders to display              Procedures  Procedures       Phone Contacts  ED Phone Contact    ED Course                               MDM  CritCare Time    Disposition  Final diagnoses:   Bacterial vaginosis     Time reflects when diagnosis was documented in both MDM as applicable and the Disposition within this note     Time User Action Codes Description Comment    8/17/2018 12:46 PM Da Henry Add [N76 0,  B96 89] Bacterial vaginosis       ED Disposition     ED Disposition Condition Comment    Discharge  Leopold Mura discharge to home/self care  Condition at discharge: Good        Follow-up Information     Follow up With Specialties Details [facility]   [address]  [id number] [phone]          Patient's Medications   Discharge Prescriptions    METRONIDAZOLE (FLAGYL) 500 MG TABLET    Take 1 tablet (500 mg total) by mouth every 12 (twelve) hours for 7 days       Start Date: 8/17/2018 End Date: 8/24/2018       Order Dose: 500 mg       Quantity: 14 tablet    Refills: 0     No discharge procedures on file      ED Provider  Electronically Signed by           Elliott Zarco PA-C  08/17/18 1251

## 2018-08-17 NOTE — DISCHARGE INSTRUCTIONS
Bacterial Vaginosis   WHAT YOU NEED TO KNOW:   Bacterial vaginosis (BV) is an infection in the vagina  It may cause vaginitis, which is irritation and inflammation of the vagina  DISCHARGE INSTRUCTIONS:   Medicines:   · Antibiotics: These are given to kill the bacteria that cause BV  They may be given as a pill or a cream to put in your vagina  Take or use as directed  · Take your medicine as directed  Contact your healthcare provider if you think your medicine is not helping or if you have side effects  Tell him of her if you are allergic to any medicine  Keep a list of the medicines, vitamins, and herbs you take  Include the amounts, and when and why you take them  Bring the list or the pill bottles to follow-up visits  Carry your medicine list with you in case of an emergency  Prevent bacterial vaginosis:   · Keep your vaginal area clean and dry:  Wear underwear and pantyhose with a cotton crotch  Wipe from front to back after you urinate or have a bowel movement  After bathing, rinse soap from your vaginal area to decrease your risk for irritation  · Do not use products that cause irritation:  Always use unscented tampons or sanitary pads  Do not use feminine sprays, powders, or scented tampons because they may cause irritation and increase your risk of BV  Detergents and fabric softeners may also cause irritation  · Do not douche: This can cause an imbalance in healthy vaginal bacteria  · Use latex condoms: This helps prevent another infection and keeps your partner from getting the infection  Contact your healthcare provider if:   · Your symptoms come back or do not improve with treatment  · You have vaginal bleeding that is not your monthly period  · You have questions or concerns about your condition or care  © 2017 [employer] Information is for End User's use only and may not be sold, redistributed or otherwise used for commercial purposes   All illustrations and images included in CareNotes® are the copyrighted property of A D [employer] M [employer]  or Esdras Brambila  The above information is an  only  It is not intended as medical advice for individual conditions or treatments  Talk to your doctor, nurse or pharmacist before following any medical regimen to see if it is safe and effective for you

## 2018-08-17 NOTE — ED NOTES
Patient awake and alert  No distress noted  No further questions upon discharge       Gerardo Shanks RN  08/17/18 5556

## 2018-08-20 ENCOUNTER — VBI (OUTPATIENT)
Dept: FAMILY MEDICINE CLINIC | Facility: CLINIC | Age: 33
End: 2018-08-20

## 2018-09-05 PROBLEM — C53.9 CERVICAL CANCER (HCC): Status: ACTIVE | Noted: 2017-05-25

## 2018-09-05 PROBLEM — N93.9 ABNORMAL UTERINE BLEEDING (AUB): Status: ACTIVE | Noted: 2018-01-12

## 2018-09-10 ENCOUNTER — OFFICE VISIT (OUTPATIENT)
Dept: FAMILY MEDICINE CLINIC | Facility: CLINIC | Age: 33
End: 2018-09-10
Payer: COMMERCIAL

## 2018-09-10 VITALS
TEMPERATURE: 97.2 F | DIASTOLIC BLOOD PRESSURE: 60 MMHG | SYSTOLIC BLOOD PRESSURE: 100 MMHG | HEART RATE: 72 BPM | WEIGHT: 156 LBS | HEIGHT: 70 IN | BODY MASS INDEX: 22.33 KG/M2 | RESPIRATION RATE: 18 BRPM

## 2018-09-10 DIAGNOSIS — Z13.6 SCREENING FOR CARDIOVASCULAR CONDITION: ICD-10-CM

## 2018-09-10 DIAGNOSIS — Z13.0 SCREENING FOR DEFICIENCY ANEMIA: ICD-10-CM

## 2018-09-10 DIAGNOSIS — Z77.21 EXPOSURE TO POTENTIALLY HAZARDOUS BODY FLUIDS: ICD-10-CM

## 2018-09-10 DIAGNOSIS — E05.90 HYPERTHYROIDISM: ICD-10-CM

## 2018-09-10 DIAGNOSIS — Z00.00 ANNUAL PHYSICAL EXAM: Primary | ICD-10-CM

## 2018-09-10 PROCEDURE — 99395 PREV VISIT EST AGE 18-39: CPT | Performed by: FAMILY MEDICINE

## 2018-09-10 NOTE — PROGRESS NOTES
FAMILY PRACTICE HEALTH MAINTENANCE OFFICE VISIT  [facility]    NAME: Lupe Cantor  AGE: 33 y o  SEX: female  : 1985     DATE: 9/10/2018    Assessment and Plan     Problem List Items Addressed This Visit     Hyperthyroidism    Relevant Orders    TSH, 3rd generation    T4, free      Other Visit Diagnoses     Annual physical exam    -  Primary    Relevant Orders    Ambulatory referral to Obstetrics / Gynecology    Screening for cardiovascular condition        Relevant Orders    Comprehensive metabolic panel    Lipid Panel with Direct LDL reflex    Screening for deficiency anemia        Relevant Orders    CBC    Exposure to potentially hazardous body fluids        Relevant Orders    Herpes I/II IgG Antibodies    Herpes I /II IgM Ab Indirect    HIV 1/2 Antigen/Antibody,Fourth Generation W/Rfl            · Patient Counseling:   · Nutrition: Stressed importance of a well balanced diet, moderation of sodium/saturated fat, caloric balance and sufficient intake of fiber  · Exercise: Stressed the importance of regular exercise with a goal of 150 minutes per week  · Dental Health: Discussed daily flossing and brushing and regular dental visits     · Immunizations reviewed Tetanus up to date, flu shot declined  · Discussed benefits of screening pap smears  · Discussed the patient's BMI with her  The BMI is in the acceptable range     Return in about 1 year (around 9/10/2019) for Annual physical         Chief Complaint     Chief Complaint   Patient presents with    Physical Exam     rmklpn       History of Present Illness     She is trying to get pregnant  She is still having some vaginal discharge           Well Adult Physical   Patient here for a comprehensive physical exam       Diet and Physical Activity  Diet: well balanced diet  Weight concerns: None, patient's BMI is between 18 5-24 9  Exercise: daily      Depression Screen  PHQ-9 Depression Screening    PHQ-9: Frequency of the following problems over the past two weeks:       Little interest or pleasure in doing things:  0 - not at all  Feeling down, depressed, or hopeless:  0 - not at all  PHQ-2 Score:  0          General Health  Hearing: Normal:  bilateral  Vision: no vision problems  Dental: regular dental visits    Reproductive Health  Currently trying to get pregnant  The following portions of the patient's history were reviewed and updated as appropriate: allergies, current medications, past family history, past medical history, past social history, past surgical history and problem list     Review of Systems     Review of Systems   Respiratory: Negative  Cardiovascular: Negative  Genitourinary: Positive for vaginal discharge  Past Medical History     Past Medical History:   Diagnosis Date    Migraine        Past Surgical History     Past Surgical History:   Procedure Laterality Date    AUGMENTATION BREAST Bilateral 2011, 2012    BREAST IMPLANT Bilateral     TONSILLECTOMY         Social History     Social History     Social History    Marital status:       Spouse name: N/A    Number of children: N/A    Years of education: N/A     Social History Main Topics    Smoking status: Never Smoker    Smokeless tobacco: Never Used    Alcohol use Yes      Comment: socially    Drug use: No    Sexual activity: Not Asked     Other Topics Concern    None     Social History Narrative    None       Family History     Family History   Problem Relation Age of Onset    Lupus Sister        Current Medications       Current Outpatient Prescriptions:     Prenatal MV-Min-Fe Fum-FA-DHA (PRENATAL 1 PO), Take 1 tablet by mouth, Disp: , Rfl:      Allergies     Allergies   Allergen Reactions    Other Other (See Comments)     Migraine medicine starts w/ a "B" causes dizziness    Sumatriptan      Other reaction(s): Paresthesia  Annotation - [id number]: neck pain       Objective     /60   Pulse 72 Temp (!) 97 2 °F (36 2 °C)   Resp 18   Ht 5' 10" (1 778 m)   Wt 70 8 kg (156 lb)   LMP 08/19/2018 (Exact Date)   BMI 22 38 kg/m²      Physical Exam   Constitutional: She is oriented to person, place, and time  She appears well-developed and well-nourished  HENT:   Head: Normocephalic and atraumatic  Right Ear: External ear normal    Left Ear: External ear normal    Mouth/Throat: Oropharynx is clear and moist    Eyes: Pupils are equal, round, and reactive to light  Neck: Normal range of motion  Cardiovascular: Normal rate, regular rhythm and normal heart sounds  Exam reveals no friction rub  No murmur heard  Pulmonary/Chest: Effort normal and breath sounds normal  No respiratory distress  She has no wheezes  She has no rales  Abdominal: Soft  Bowel sounds are normal  She exhibits no distension and no mass  There is no tenderness  There is no rebound and no guarding  Musculoskeletal: Normal range of motion  She exhibits no edema  Neurological: She is alert and oriented to person, place, and time  She has normal reflexes  Skin: Skin is warm  Nursing note and vitals reviewed           Visual Acuity Screening    Right eye Left eye Both eyes   Without correction: 20/25 20/30 20/25   With correction:          Health Maintenance     Health Maintenance   Topic Date Due    Pneumococcal PPSV23 Highest Risk Adult (1 of 3 - PCV13) 10/16/2005    PAP SMEAR  10/16/2007    INFLUENZA VACCINE  09/01/2018    DTaP,Tdap,and Td Vaccines (2 - Td) 08/24/2021     Immunization History   Administered Date(s) Administered    Tdap 08/24/2011       Randal Peña DO  [address]

## 2018-09-17 DIAGNOSIS — Z77.21 EXPOSURE TO POTENTIALLY HAZARDOUS BODY FLUIDS: Primary | ICD-10-CM

## 2018-09-17 LAB
ALBUMIN SERPL-MCNC: 4.3 G/DL (ref 3.6–5.1)
ALBUMIN/GLOB SERPL: 1.6 (CALC) (ref 1–2.5)
ALP SERPL-CCNC: 35 U/L (ref 33–115)
ALT SERPL-CCNC: 13 U/L (ref 6–29)
AST SERPL-CCNC: 16 U/L (ref 10–30)
BASOPHILS # BLD AUTO: 52 CELLS/UL (ref 0–200)
BASOPHILS NFR BLD AUTO: 1.1 %
BILIRUB SERPL-MCNC: 0.7 MG/DL (ref 0.2–1.2)
BUN SERPL-MCNC: 11 MG/DL (ref 7–25)
BUN/CREAT SERPL: NORMAL (CALC) (ref 6–22)
CALCIUM SERPL-MCNC: 9.3 MG/DL (ref 8.6–10.2)
CHLORIDE SERPL-SCNC: 103 MMOL/L (ref 98–110)
CHOLEST SERPL-MCNC: 248 MG/DL
CHOLEST/HDLC SERPL: 2.5 (CALC)
CO2 SERPL-SCNC: 28 MMOL/L (ref 20–32)
CREAT SERPL-MCNC: 0.71 MG/DL (ref 0.5–1.1)
EOSINOPHIL # BLD AUTO: 19 CELLS/UL (ref 15–500)
EOSINOPHIL NFR BLD AUTO: 0.4 %
ERYTHROCYTE [DISTWIDTH] IN BLOOD BY AUTOMATED COUNT: 13.5 % (ref 11–15)
GLOBULIN SER CALC-MCNC: 2.7 G/DL (CALC) (ref 1.9–3.7)
GLUCOSE SERPL-MCNC: 83 MG/DL (ref 65–99)
HCT VFR BLD AUTO: 35 % (ref 35–45)
HDLC SERPL-MCNC: 98 MG/DL
HGB BLD-MCNC: 11.3 G/DL (ref 11.7–15.5)
HIV 1+2 AB+HIV1 P24 AG SERPL QL IA: NORMAL
HSV1 IGG SER IA-ACNC: <0.9 INDEX
HSV1 IGM SER QL IF: NEGATIVE
HSV2 IGG SER IA-ACNC: <0.9 INDEX
HSV2 IGM SER QL IF: NEGATIVE
LDLC SERPL CALC-MCNC: 136 MG/DL (CALC)
LYMPHOCYTES # BLD AUTO: 2317 CELLS/UL (ref 850–3900)
LYMPHOCYTES NFR BLD AUTO: 49.3 %
MCH RBC QN AUTO: 27.5 PG (ref 27–33)
MCHC RBC AUTO-ENTMCNC: 32.3 G/DL (ref 32–36)
MCV RBC AUTO: 85.2 FL (ref 80–100)
MONOCYTES # BLD AUTO: 259 CELLS/UL (ref 200–950)
MONOCYTES NFR BLD AUTO: 5.5 %
NEUTROPHILS # BLD AUTO: 2054 CELLS/UL (ref 1500–7800)
NEUTROPHILS NFR BLD AUTO: 43.7 %
NONHDLC SERPL-MCNC: 150 MG/DL (CALC)
PLATELET # BLD AUTO: 183 THOUSAND/UL (ref 140–400)
PMV BLD REES-ECKER: 13.1 FL (ref 7.5–12.5)
POTASSIUM SERPL-SCNC: 3.7 MMOL/L (ref 3.5–5.3)
PROT SERPL-MCNC: 7 G/DL (ref 6.1–8.1)
RBC # BLD AUTO: 4.11 MILLION/UL (ref 3.8–5.1)
SL AMB EGFR AFRICAN AMERICAN: 132 ML/MIN/1.73M2
SL AMB EGFR NON AFRICAN AMERICAN: 113 ML/MIN/1.73M2
SODIUM SERPL-SCNC: 138 MMOL/L (ref 135–146)
T4 FREE SERPL-MCNC: 1 NG/DL (ref 0.8–1.8)
TRIGL SERPL-MCNC: 52 MG/DL
TSH SERPL-ACNC: 1.69 MIU/L
WBC # BLD AUTO: 4.7 THOUSAND/UL (ref 3.8–10.8)

## 2019-05-10 ENCOUNTER — OFFICE VISIT (OUTPATIENT)
Dept: FAMILY MEDICINE CLINIC | Facility: CLINIC | Age: 34
End: 2019-05-10
Payer: COMMERCIAL

## 2019-05-10 VITALS
RESPIRATION RATE: 16 BRPM | WEIGHT: 144 LBS | DIASTOLIC BLOOD PRESSURE: 66 MMHG | HEIGHT: 70 IN | TEMPERATURE: 97.5 F | HEART RATE: 84 BPM | SYSTOLIC BLOOD PRESSURE: 124 MMHG | BODY MASS INDEX: 20.62 KG/M2

## 2019-05-10 DIAGNOSIS — F32.A ANXIETY AND DEPRESSION: Primary | ICD-10-CM

## 2019-05-10 DIAGNOSIS — F41.9 ANXIETY AND DEPRESSION: Primary | ICD-10-CM

## 2019-05-10 PROCEDURE — 99214 OFFICE O/P EST MOD 30 MIN: CPT | Performed by: NURSE PRACTITIONER

## 2019-05-10 RX ORDER — SERTRALINE HYDROCHLORIDE 25 MG/1
25 TABLET, FILM COATED ORAL DAILY
Qty: 40 TABLET | Refills: 0 | Status: SHIPPED | OUTPATIENT
Start: 2019-05-10 | End: 2019-10-11 | Stop reason: ALTCHOICE

## 2019-10-11 ENCOUNTER — OFFICE VISIT (OUTPATIENT)
Dept: FAMILY MEDICINE CLINIC | Facility: CLINIC | Age: 34
End: 2019-10-11
Payer: COMMERCIAL

## 2019-10-11 VITALS
BODY MASS INDEX: 21.18 KG/M2 | RESPIRATION RATE: 18 BRPM | WEIGHT: 143 LBS | SYSTOLIC BLOOD PRESSURE: 120 MMHG | HEART RATE: 60 BPM | DIASTOLIC BLOOD PRESSURE: 66 MMHG | OXYGEN SATURATION: 98 % | HEIGHT: 69 IN | TEMPERATURE: 98.4 F

## 2019-10-11 DIAGNOSIS — E04.1 THYROID NODULE: ICD-10-CM

## 2019-10-11 DIAGNOSIS — Z00.00 ANNUAL PHYSICAL EXAM: Primary | ICD-10-CM

## 2019-10-11 DIAGNOSIS — Z13.0 SCREENING FOR DEFICIENCY ANEMIA: ICD-10-CM

## 2019-10-11 DIAGNOSIS — Z13.6 SCREENING FOR CARDIOVASCULAR CONDITION: ICD-10-CM

## 2019-10-11 PROBLEM — C53.9 CERVICAL CANCER (HCC): Status: RESOLVED | Noted: 2017-05-25 | Resolved: 2019-10-11

## 2019-10-11 PROCEDURE — 99395 PREV VISIT EST AGE 18-39: CPT | Performed by: FAMILY MEDICINE

## 2019-10-11 RX ORDER — MULTIVITAMIN
1 CAPSULE ORAL DAILY
COMMUNITY
End: 2021-11-19 | Stop reason: ALTCHOICE

## 2019-10-11 NOTE — PROGRESS NOTES
FAMILY PRACTICE HEALTH MAINTENANCE OFFICE VISIT  [facility]    NAME: Meghna Pierre  AGE: 29 y o  SEX: female  : 1985     DATE: 10/11/2019    Assessment and Plan     1  Annual physical exam    2  Thyroid nodule  -     TSH, 3rd generation; Future  -     T4, free; Future  -     TSH, 3rd generation  -     T4, free    3  Screening for cardiovascular condition  -     Comprehensive metabolic panel; Future  -     Lipid Panel with Direct LDL reflex; Future  -     Comprehensive metabolic panel  -     Lipid Panel with Direct LDL reflex    4  Screening for deficiency anemia  -     CBC; Future  -     CBC            · Patient Counseling:   · Nutrition: Stressed importance of a well balanced diet, moderation of sodium/saturated fat, caloric balance and sufficient intake of fiber  · Exercise: Stressed the importance of regular exercise with a goal of 150 minutes per week  · Dental Health: Discussed daily flossing and brushing and regular dental visits     · Immunizations reviewed: Declined recommended vaccinations  · Discussed benefits of:  Cervical Cancer screening  BMI Counseling: Body mass index is 21 12 kg/m²  Discussed with patient's BMI with her  Return in about 1 year (around 10/11/2020) for Annual physical         Chief Complaint     Chief Complaint   Patient presents with    Physical Exam     rmklpn       History of Present Illness     She is going to her therapist   She has not needed her therapist    She is planning a wedding         Well Adult Physical   Patient here for a comprehensive physical exam       Diet and Physical Activity  Diet: well balanced diet  Exercise: frequently      Depression Screen  PHQ-9 Depression Screening    PHQ-9:    Frequency of the following problems over the past two weeks:       Little interest or pleasure in doing things:  0 - not at all  Feeling down, depressed, or hopeless:  0 - not at all  PHQ-2 Score:  0          General Health  Hearing: Normal:  bilateral  Vision: wears glasses  Dental: regular dental visits    Reproductive Health  Follows with gynecologist and periods have been long      The following portions of the patient's history were reviewed and updated as appropriate: allergies, current medications, past family history, past medical history, past social history, past surgical history and problem list     Review of Systems     Review of Systems   Constitutional: Negative  Respiratory: Negative  Cardiovascular: Negative  Past Medical History     Past Medical History:   Diagnosis Date    [facility]2017    Migraine        Past Surgical History     Past Surgical History:   Procedure Laterality Date    AUGMENTATION BREAST Bilateral 2011, 2012    BREAST IMPLANT Bilateral     TONSILLECTOMY         Social History     Social History     Socioeconomic History    Marital status:       Spouse name: None    Number of children: None    Years of education: None    Highest education level: None   Occupational History    None   Social Needs    Financial resource strain: None    Food insecurity:     Worry: None     Inability: None    Transportation needs:     Medical: None     Non-medical: None   Tobacco Use    Smoking status: Never Smoker    Smokeless tobacco: Never Used   Substance and Sexual Activity    Alcohol use: Yes     Frequency: Monthly or less     Drinks per session: 1 or 2     Binge frequency: Never     Comment: socially    Drug use: No    Sexual activity: None   Lifestyle    Physical activity:     Days per week: None     Minutes per session: None    Stress: None   Relationships    Social connections:     Talks on phone: None     Gets together: None     Attends Faith service: None     Active member of club or organization: None     Attends meetings of clubs or organizations: None     Relationship status: None    Intimate partner violence:     Fear of current or ex partner: None     Emotionally abused: None     Physically abused: None     Forced sexual activity: None   Other Topics Concern    None   Social History Narrative    None       Family History     Family History   Problem Relation Age of Onset    Lupus Sister        Current Medications       Current Outpatient Medications:     Multiple Vitamin (MULTIVITAMIN) capsule, Take 1 capsule by mouth daily, Disp: , Rfl:      Allergies     Allergies   Allergen Reactions    Other Other (See Comments)     Migraine medicine starts w/ a "B" causes dizziness    Sumatriptan      Other reaction(s): Paresthesia  Annotation - [id number]: neck pain       Objective     /66   Pulse 60   Temp 98 4 °F (36 9 °C)   Resp 18   Ht 5' 9" (1 753 m)   Wt 64 9 kg (143 lb)   LMP 09/09/2019 (Exact Date)   SpO2 98%   BMI 21 12 kg/m²      Physical Exam   Constitutional: She is oriented to person, place, and time  She appears well-developed and well-nourished  HENT:   Head: Normocephalic and atraumatic  Right Ear: External ear normal    Left Ear: External ear normal    Mouth/Throat: Oropharynx is clear and moist    Eyes: Pupils are equal, round, and reactive to light  Neck: Normal range of motion  Cardiovascular: Normal rate, regular rhythm and normal heart sounds  Exam reveals no friction rub  No murmur heard  Pulmonary/Chest: Effort normal and breath sounds normal  No respiratory distress  She has no wheezes  She has no rales  Abdominal: Soft  Bowel sounds are normal  She exhibits no distension and no mass  There is no tenderness  There is no rebound and no guarding  Musculoskeletal: Normal range of motion  She exhibits no edema  Neurological: She is alert and oriented to person, place, and time  She has normal reflexes  Skin: Skin is warm  Nursing note and vitals reviewed           Visual Acuity Screening    Right eye Left eye Both eyes   Without correction:      With correction: 20/20 20/25 20/20           Ancel Plane Kan Ralph, [address]

## 2019-10-22 ENCOUNTER — OFFICE VISIT (OUTPATIENT)
Dept: FAMILY MEDICINE CLINIC | Facility: CLINIC | Age: 34
End: 2019-10-22
Payer: COMMERCIAL

## 2019-10-22 VITALS
TEMPERATURE: 99.3 F | SYSTOLIC BLOOD PRESSURE: 120 MMHG | WEIGHT: 144.4 LBS | RESPIRATION RATE: 16 BRPM | BODY MASS INDEX: 21.39 KG/M2 | DIASTOLIC BLOOD PRESSURE: 82 MMHG | HEART RATE: 76 BPM | HEIGHT: 69 IN

## 2019-10-22 DIAGNOSIS — E04.1 THYROID NODULE: ICD-10-CM

## 2019-10-22 DIAGNOSIS — E01.0 THYROMEGALY: Primary | ICD-10-CM

## 2019-10-22 PROCEDURE — 99213 OFFICE O/P EST LOW 20 MIN: CPT | Performed by: NURSE PRACTITIONER

## 2019-10-22 NOTE — PROGRESS NOTES
Assessment/Plan:    Will add thyroid antibodies on and check an US  Will f/u with these results  1  Thyromegaly  -     US thyroid; Future; Expected date: 10/22/2019  -     Thyroid Antibodies Panel; Future    2  Thyroid nodule  -     US thyroid; Future; Expected date: 10/22/2019  -     Thyroid Antibodies Panel; Future                  There are no Patient Instructions on file for this visit  Return if symptoms worsen or fail to improve  Subjective:      Patient ID: Femi Lakhani is a 36 y o  female  Chief Complaint   Patient presents with    swollen neck     for 4 days prcma    Difficulty Swallowing       Here today with complaints of neck swelling and discomfort  She feels like the right side of her thyroid is enlarged  She has difficulty with swallowing  She has a history of a thyroid nodule, unsure which lobe was affected  She has never required thyroid biopsy or been on medication for this  She has an order for labs which she plans on doing tomorrow  The following portions of the patient's history were reviewed and updated as appropriate: allergies, current medications, past family history, past medical history, past social history, past surgical history and problem list     Review of Systems   Constitutional: Negative  HENT: Positive for trouble swallowing  Respiratory: Negative for cough, chest tightness and shortness of breath  Cardiovascular: Negative for chest pain  Endocrine:        See HPI   Neurological: Negative for dizziness and headaches  Current Outpatient Medications   Medication Sig Dispense Refill    Multiple Vitamin (MULTIVITAMIN) capsule Take 1 capsule by mouth daily       No current facility-administered medications for this visit          Objective:    /82   Pulse 76   Temp 99 3 °F (37 4 °C)   Resp 16   Ht 5' 9" (1 753 m)   Wt 65 5 kg (144 lb 6 4 oz)   BMI 21 32 kg/m²        Physical Exam   Constitutional: She appears well-developed and well-nourished  HENT:   Head: Normocephalic and atraumatic  Right Ear: Tympanic membrane, external ear and ear canal normal    Left Ear: Tympanic membrane, external ear and ear canal normal    Nose: No mucosal edema or rhinorrhea  Mouth/Throat: Uvula is midline, oropharynx is clear and moist and mucous membranes are normal    Eyes: Conjunctivae are normal    Neck: Neck supple  No edema present  Thyromegaly (right sided) present  No thyroid mass present  Cardiovascular: Normal rate, regular rhythm, normal heart sounds and intact distal pulses  No murmur heard  Pulmonary/Chest: Effort normal and breath sounds normal    Abdominal: Bowel sounds are normal  She exhibits no distension  There is no splenomegaly or hepatomegaly  There is no tenderness  Lymphadenopathy:        Right cervical: No superficial cervical adenopathy present  Left cervical: No superficial cervical adenopathy present  Skin: Skin is warm, dry and intact  No rash noted  Psychiatric: She has a normal mood and affect  Nursing note and vitals reviewed               Chiara Simon

## 2019-10-24 ENCOUNTER — HOSPITAL ENCOUNTER (OUTPATIENT)
Dept: ULTRASOUND IMAGING | Facility: HOSPITAL | Age: 34
Discharge: HOME | End: 2019-10-24
Payer: COMMERCIAL

## 2019-10-24 DIAGNOSIS — E04.1 THYROID NODULE: ICD-10-CM

## 2019-10-24 DIAGNOSIS — E01.0 THYROMEGALY: ICD-10-CM

## 2019-10-24 PROCEDURE — 76536 US EXAM OF HEAD AND NECK: CPT

## 2019-10-25 LAB
ALBUMIN SERPL-MCNC: 4.6 G/DL (ref 3.6–5.1)
ALBUMIN/GLOB SERPL: 1.6 (CALC) (ref 1–2.5)
ALP SERPL-CCNC: 33 U/L (ref 33–115)
ALT SERPL-CCNC: 11 U/L (ref 6–29)
AST SERPL-CCNC: 15 U/L (ref 10–30)
BILIRUB SERPL-MCNC: 0.7 MG/DL (ref 0.2–1.2)
BUN SERPL-MCNC: 13 MG/DL (ref 7–25)
BUN/CREAT SERPL: NORMAL (CALC) (ref 6–22)
CALCIUM SERPL-MCNC: 10.2 MG/DL (ref 8.6–10.2)
CHLORIDE SERPL-SCNC: 104 MMOL/L (ref 98–110)
CHOLEST SERPL-MCNC: 264 MG/DL
CHOLEST/HDLC SERPL: 3 (CALC)
CO2 SERPL-SCNC: 29 MMOL/L (ref 20–32)
CREAT SERPL-MCNC: 0.88 MG/DL (ref 0.5–1.1)
ERYTHROCYTE [DISTWIDTH] IN BLOOD BY AUTOMATED COUNT: 12.9 % (ref 11–15)
GLOBULIN SER CALC-MCNC: 2.8 G/DL (CALC) (ref 1.9–3.7)
GLUCOSE SERPL-MCNC: 84 MG/DL (ref 65–99)
HCT VFR BLD AUTO: 38.7 % (ref 35–45)
HDLC SERPL-MCNC: 89 MG/DL
HGB BLD-MCNC: 12.4 G/DL (ref 11.7–15.5)
LDLC SERPL CALC-MCNC: 159 MG/DL (CALC)
MCH RBC QN AUTO: 27.4 PG (ref 27–33)
MCHC RBC AUTO-ENTMCNC: 32 G/DL (ref 32–36)
MCV RBC AUTO: 85.4 FL (ref 80–100)
NONHDLC SERPL-MCNC: 175 MG/DL (CALC)
PLATELET # BLD AUTO: 226 THOUSAND/UL (ref 140–400)
PMV BLD REES-ECKER: 12.2 FL (ref 7.5–12.5)
POTASSIUM SERPL-SCNC: 3.9 MMOL/L (ref 3.5–5.3)
PROT SERPL-MCNC: 7.4 G/DL (ref 6.1–8.1)
RBC # BLD AUTO: 4.53 MILLION/UL (ref 3.8–5.1)
SL AMB EGFR AFRICAN AMERICAN: 100 ML/MIN/1.73M2
SL AMB EGFR NON AFRICAN AMERICAN: 86 ML/MIN/1.73M2
SODIUM SERPL-SCNC: 140 MMOL/L (ref 135–146)
T4 FREE SERPL-MCNC: 1.2 NG/DL (ref 0.8–1.8)
THYROGLOB AB SERPL-ACNC: <1 IU/ML
THYROPEROXIDASE AB SERPL-ACNC: 1 IU/ML
TRIGL SERPL-MCNC: 66 MG/DL
TSH SERPL-ACNC: 1.31 MIU/L
WBC # BLD AUTO: 6.1 THOUSAND/UL (ref 3.8–10.8)

## 2019-10-28 DIAGNOSIS — E78.00 ELEVATED CHOLESTEROL: Primary | ICD-10-CM

## 2019-10-29 ENCOUNTER — TELEPHONE (OUTPATIENT)
Dept: FAMILY MEDICINE CLINIC | Facility: CLINIC | Age: 34
End: 2019-10-29

## 2019-10-29 DIAGNOSIS — E04.1 RIGHT THYROID NODULE: Primary | ICD-10-CM

## 2019-10-29 NOTE — TELEPHONE ENCOUNTER
She returned your call and left her message on the results hotline  She wants to be called back at the [phone] number in her chart   Edgar Hester

## 2019-10-29 NOTE — TELEPHONE ENCOUNTER
Discussed findings w/ pt  There are 3 nodules in the right lobe of the thyroid, one of which needs to be biopsied  I explained the procedure to the patient and all questions were answered  She was given the number for central scheduling and will call to schedule this appt  Will f/u with results   Levonia File

## 2019-11-17 ENCOUNTER — HOSPITAL ENCOUNTER (EMERGENCY)
Facility: HOSPITAL | Age: 34
Discharge: HOME | End: 2019-11-17
Attending: EMERGENCY MEDICINE
Payer: COMMERCIAL

## 2019-11-17 ENCOUNTER — APPOINTMENT (EMERGENCY)
Dept: RADIOLOGY | Facility: HOSPITAL | Age: 34
End: 2019-11-17
Payer: COMMERCIAL

## 2019-11-17 VITALS
SYSTOLIC BLOOD PRESSURE: 119 MMHG | HEIGHT: 69 IN | HEART RATE: 60 BPM | BODY MASS INDEX: 22.07 KG/M2 | RESPIRATION RATE: 16 BRPM | DIASTOLIC BLOOD PRESSURE: 78 MMHG | WEIGHT: 149.03 LBS | TEMPERATURE: 98.4 F | OXYGEN SATURATION: 97 %

## 2019-11-17 DIAGNOSIS — R00.2 PALPITATIONS: Primary | ICD-10-CM

## 2019-11-17 LAB
ANION GAP SERPL CALCULATED.3IONS-SCNC: 8 MMOL/L (ref 4–13)
ATRIAL RATE: 74 BPM
BASOPHILS # BLD AUTO: 0.05 THOUSANDS/ΜL (ref 0–0.1)
BASOPHILS NFR BLD AUTO: 1 % (ref 0–1)
BUN SERPL-MCNC: 9 MG/DL (ref 5–25)
CALCIUM SERPL-MCNC: 9.2 MG/DL (ref 8.3–10.1)
CHLORIDE SERPL-SCNC: 105 MMOL/L (ref 100–108)
CO2 SERPL-SCNC: 31 MMOL/L (ref 21–32)
CREAT SERPL-MCNC: 0.78 MG/DL (ref 0.6–1.3)
EOSINOPHIL # BLD AUTO: 0.06 THOUSAND/ΜL (ref 0–0.61)
EOSINOPHIL NFR BLD AUTO: 1 % (ref 0–6)
ERYTHROCYTE [DISTWIDTH] IN BLOOD BY AUTOMATED COUNT: 13.6 % (ref 11.6–15.1)
GFR SERPL CREATININE-BSD FRML MDRD: 116 ML/MIN/1.73SQ M
GLUCOSE SERPL-MCNC: 96 MG/DL (ref 65–140)
HCT VFR BLD AUTO: 37.8 % (ref 34.8–46.1)
HGB BLD-MCNC: 11.8 G/DL (ref 11.5–15.4)
IMM GRANULOCYTES # BLD AUTO: 0.01 THOUSAND/UL (ref 0–0.2)
IMM GRANULOCYTES NFR BLD AUTO: 0 % (ref 0–2)
LYMPHOCYTES # BLD AUTO: 2.26 THOUSANDS/ΜL (ref 0.6–4.47)
LYMPHOCYTES NFR BLD AUTO: 51 % (ref 14–44)
MCH RBC QN AUTO: 28 PG (ref 26.8–34.3)
MCHC RBC AUTO-ENTMCNC: 31.2 G/DL (ref 31.4–37.4)
MCV RBC AUTO: 90 FL (ref 82–98)
MONOCYTES # BLD AUTO: 0.25 THOUSAND/ΜL (ref 0.17–1.22)
MONOCYTES NFR BLD AUTO: 6 % (ref 4–12)
NEUTROPHILS # BLD AUTO: 1.81 THOUSANDS/ΜL (ref 1.85–7.62)
NEUTS SEG NFR BLD AUTO: 41 % (ref 43–75)
NRBC BLD AUTO-RTO: 0 /100 WBCS
P AXIS: 81 DEGREES
PLATELET # BLD AUTO: 222 THOUSANDS/UL (ref 149–390)
PMV BLD AUTO: 12.1 FL (ref 8.9–12.7)
POTASSIUM SERPL-SCNC: 3.4 MMOL/L (ref 3.5–5.3)
PR INTERVAL: 132 MS
QRS AXIS: 81 DEGREES
QRSD INTERVAL: 112 MS
QT INTERVAL: 398 MS
QTC INTERVAL: 441 MS
RBC # BLD AUTO: 4.22 MILLION/UL (ref 3.81–5.12)
SODIUM SERPL-SCNC: 144 MMOL/L (ref 136–145)
T WAVE AXIS: 62 DEGREES
TSH SERPL DL<=0.05 MIU/L-ACNC: 3.73 UIU/ML (ref 0.36–3.74)
VENTRICULAR RATE: 74 BPM
WBC # BLD AUTO: 4.44 THOUSAND/UL (ref 4.31–10.16)

## 2019-11-17 PROCEDURE — 85025 COMPLETE CBC W/AUTO DIFF WBC: CPT | Performed by: EMERGENCY MEDICINE

## 2019-11-17 PROCEDURE — 71045 X-RAY EXAM CHEST 1 VIEW: CPT

## 2019-11-17 PROCEDURE — 93010 ELECTROCARDIOGRAM REPORT: CPT | Performed by: INTERNAL MEDICINE

## 2019-11-17 PROCEDURE — 93005 ELECTROCARDIOGRAM TRACING: CPT

## 2019-11-17 PROCEDURE — 99285 EMERGENCY DEPT VISIT HI MDM: CPT | Performed by: EMERGENCY MEDICINE

## 2019-11-17 PROCEDURE — 36415 COLL VENOUS BLD VENIPUNCTURE: CPT | Performed by: EMERGENCY MEDICINE

## 2019-11-17 PROCEDURE — 84443 ASSAY THYROID STIM HORMONE: CPT | Performed by: EMERGENCY MEDICINE

## 2019-11-17 PROCEDURE — 80048 BASIC METABOLIC PNL TOTAL CA: CPT | Performed by: EMERGENCY MEDICINE

## 2019-11-17 PROCEDURE — 99285 EMERGENCY DEPT VISIT HI MDM: CPT

## 2019-11-18 NOTE — ED PROVIDER NOTES
History  Chief Complaint   Patient presents with    Palpitations     Pt complains of a lot of heart palpatations lately  Pt states that she had hyper thyroid when she was 19 and is getting it tested again soon  21-year-old female comes in for palpitations  Patient states that over the past 24 hours she has had multiple episodes where she feels like her heart is racing and pounding"  Patient is concerned because she has a history of hyperthyroidism she recently had her blood work rechecked and was told it was normal   They also found nodules in her thyroid she has a biopsy this week she is currently planning her wedding  She does not know if this is anxiety or something more  History provided by:  Patient   used: No    Palpitations   Palpitations quality:  Fast  Onset quality:  Sudden  Timing:  Intermittent  Progression:  Waxing and waning  Chronicity:  New  Context: anxiety    Ineffective treatments:  None tried  Associated symptoms: no back pain, no chest pain, no chest pressure, no cough, no dizziness, no malaise/fatigue, no nausea, no numbness, no orthopnea, no shortness of breath, no syncope and no vomiting    Risk factors: hx of thyroid disease and hyperthyroidism    Risk factors: no diabetes mellitus and no heart disease        Prior to Admission Medications   Prescriptions Last Dose Informant Patient Reported? Taking? Multiple Vitamin (MULTIVITAMIN) capsule  Self Yes No   Sig: Take 1 capsule by mouth daily      Facility-Administered Medications: None       Past Medical History:   Diagnosis Date    [facility]    Migraine        Past Surgical History:   Procedure Laterality Date    AUGMENTATION BREAST Bilateral 2011, 2012    BREAST IMPLANT Bilateral     TONSILLECTOMY         Family History   Problem Relation Age of Onset    Lupus Sister      I have reviewed and agree with the history as documented      Social History     Tobacco Use    Smoking status: Never Smoker    Smokeless tobacco: Never Used   Substance Use Topics    Alcohol use: Yes     Frequency: Monthly or less     Drinks per session: 1 or 2     Binge frequency: Never     Comment: socially    Drug use: Yes     Types: Marijuana     Comment: daily for sleep        Review of Systems   Constitutional: Negative for fatigue, fever and malaise/fatigue  HENT: Negative for congestion and ear pain  Eyes: Negative for discharge and redness  Respiratory: Negative for apnea, cough, shortness of breath and wheezing  Cardiovascular: Negative for chest pain, orthopnea and syncope  Gastrointestinal: Negative for abdominal pain, diarrhea, nausea and vomiting  Endocrine: Negative for cold intolerance and polydipsia  Genitourinary: Negative for difficulty urinating and hematuria  Musculoskeletal: Negative for arthralgias and back pain  Skin: Negative for color change and rash  Allergic/Immunologic: Negative for environmental allergies and immunocompromised state  Neurological: Negative for dizziness, numbness and headaches  Hematological: Negative for adenopathy  Does not bruise/bleed easily  Psychiatric/Behavioral: Negative for agitation and behavioral problems  Physical Exam  Physical Exam   Constitutional: She is oriented to person, place, and time  Vital signs are normal  She appears well-developed and well-nourished  Non-toxic appearance  HENT:   Head: Normocephalic and atraumatic  Right Ear: Tympanic membrane and external ear normal    Left Ear: Tympanic membrane and external ear normal    Nose: Nose normal  No rhinorrhea, sinus tenderness or nasal deformity  Mouth/Throat: Uvula is midline and oropharynx is clear and moist  Normal dentition  Eyes: Pupils are equal, round, and reactive to light  Conjunctivae, EOM and lids are normal  Right eye exhibits no discharge  Left eye exhibits no discharge  Neck: Trachea normal and normal range of motion  Neck supple   No JVD present  Carotid bruit is not present  Cardiovascular: Normal rate, regular rhythm, intact distal pulses and normal pulses  No extrasystoles are present  PMI is not displaced  Pulmonary/Chest: Effort normal and breath sounds normal  No accessory muscle usage  No respiratory distress  She has no wheezes  She has no rhonchi  She has no rales  Abdominal: Soft  Normal appearance and bowel sounds are normal  She exhibits no mass  There is no tenderness  There is no rigidity, no rebound and no guarding  Musculoskeletal:        Right shoulder: She exhibits normal range of motion, no bony tenderness, no swelling and no deformity  Cervical back: Normal  She exhibits normal range of motion, no tenderness, no bony tenderness and no deformity  Lymphadenopathy:     She has no cervical adenopathy  She has no axillary adenopathy  Neurological: She is alert and oriented to person, place, and time  She has normal strength and normal reflexes  No cranial nerve deficit or sensory deficit  GCS eye subscore is 4  GCS verbal subscore is 5  GCS motor subscore is 6  Skin: Skin is warm and dry  No rash noted  Psychiatric: She has a normal mood and affect  Her speech is normal and behavior is normal    Nursing note and vitals reviewed        Vital Signs  ED Triage Vitals [11/17/19 1833]   Temperature Pulse Respirations Blood Pressure SpO2   98 4 °F (36 9 °C) 73 16 160/89 100 %      Temp Source Heart Rate Source Patient Position - Orthostatic VS BP Location FiO2 (%)   Oral Monitor Lying Right arm --      Pain Score       No Pain           Vitals:    11/17/19 1833 11/17/19 2003   BP: 160/89 119/78   Pulse: 73 60   Patient Position - Orthostatic VS: Lying Lying         Visual Acuity      ED Medications  Medications - No data to display    Diagnostic Studies  Results Reviewed     Procedure Component Value Units Date/Time    Basic metabolic panel [[id number]]  (Abnormal) Collected:  11/17/19 1857    Lab Status:  Final result Specimen:  Blood from Arm, Right Updated:  11/17/19 1925     Sodium 144 mmol/L      Potassium 3 4 mmol/L      Chloride 105 mmol/L      CO2 31 mmol/L      ANION GAP 8 mmol/L      BUN 9 mg/dL      Creatinine 0 78 mg/dL      Glucose 96 mg/dL      Calcium 9 2 mg/dL      eGFR 116 ml/min/1 73sq m     Narrative:       [place] guidelines for Chronic Kidney Disease (CKD):     Stage 1 with normal or high GFR (GFR > 90 mL/min/1 73 square meters)    Stage 2 Mild CKD (GFR = 60-89 mL/min/1 73 square meters)    Stage 3A Moderate CKD (GFR = 45-59 mL/min/1 73 square meters)    Stage 3B Moderate CKD (GFR = 30-44 mL/min/1 73 square meters)    Stage 4 Severe CKD (GFR = 15-29 mL/min/1 73 square meters)    Stage 5 End Stage CKD (GFR <15 mL/min/1 73 square meters)  Note: GFR calculation is accurate only with a steady state creatinine    TSH [[id number]]  (Normal) Collected:  11/17/19 1857    Lab Status:  Final result Specimen:  Blood from Arm, Right Updated:  11/17/19 1925     TSH 3RD GENERATON 3 728 uIU/mL     Narrative:       Patients undergoing fluorescein dye angiography may retain small amounts of fluorescein in the body for 48-72 hours post procedure  Samples containing fluorescein can produce falsely depressed TSH values  If the patient had this procedure,a specimen should be resubmitted post fluorescein clearance        CBC and differential [[id number]]  (Abnormal) Collected:  11/17/19 1857    Lab Status:  Final result Specimen:  Blood from Arm, Right Updated:  11/17/19 1903     WBC 4 44 Thousand/uL      RBC 4 22 Million/uL      Hemoglobin 11 8 g/dL      Hematocrit 37 8 %      MCV 90 fL      MCH 28 0 pg      MCHC 31 2 g/dL      RDW 13 6 %      MPV 12 1 fL      Platelets 055 Thousands/uL      nRBC 0 /100 WBCs      Neutrophils Relative 41 %      Immat GRANS % 0 %      Lymphocytes Relative 51 %      Monocytes Relative 6 %      Eosinophils Relative 1 %      Basophils Relative 1 %      Neutrophils Absolute 1 81 Thousands/µL      Immature Grans Absolute 0 01 Thousand/uL      Lymphocytes Absolute 2 26 Thousands/µL      Monocytes Absolute 0 25 Thousand/µL      Eosinophils Absolute 0 06 Thousand/µL      Basophils Absolute 0 05 Thousands/µL                  XR chest 1 view portable    (Results Pending)              Procedures  ECG 12 Lead Documentation Only  Date/Time: 11/17/2019 6:53 PM  Performed by: Meme Yang DO  Authorized by: Meme Yang DO     Patient location:  ED  Previous ECG:     Previous ECG:  Compared to current    Similarity:  No change  Rate:     ECG rate:  74  Rhythm:     Rhythm: sinus rhythm    Conduction:     Conduction: abnormal      Abnormal conduction: incomplete RBBB             ED Course                               MDM  Number of Diagnoses or Management Options  Palpitations: new and requires workup     Amount and/or Complexity of Data Reviewed  Clinical lab tests: ordered and reviewed  Tests in the radiology section of CPT®: ordered and reviewed  Tests in the medicine section of CPT®: ordered and reviewed  Independent visualization of images, tracings, or specimens: yes        Disposition  Final diagnoses:   Palpitations     Time reflects when diagnosis was documented in both MDM as applicable and the Disposition within this note     Time User Action Codes Description Comment    11/17/2019  7:29 PM Harsh Diaz Add [R00 2] Palpitations       ED Disposition     ED Disposition Condition Date/Time Comment    Discharge Stable Sun Nov 17, 2019  7:29 PM Krystian Ramírez discharge to home/self care              Follow-up Information     Follow up With Specialties Details Why Contact Info Additional Information    Merlene Dumont DO Family Medicine Schedule an appointment as soon as possible for a visit   [address] [facility] [address] [place] [address] [facility] Schedule an appointment as soon as possible for a visit   [address] [place] [address] Rufus Truong  [facility]  [phone]  [address] [facility], [facility] [place], [place], [facility]          Discharge Medication List as of 11/17/2019  7:29 PM      CONTINUE these medications which have NOT CHANGED    Details   Multiple Vitamin (MULTIVITAMIN) capsule Take 1 capsule by mouth daily, Historical Med           No discharge procedures on file      ED Provider  Electronically Signed by           Eleonora Sage DO  11/17/19 5040

## 2019-11-19 ENCOUNTER — VBI (OUTPATIENT)
Dept: FAMILY MEDICINE CLINIC | Facility: CLINIC | Age: 34
End: 2019-11-19

## 2019-11-19 NOTE — TELEPHONE ENCOUNTER
Heriberto Ralph    ED Visit Information     Ed visit date: 11/17/19  Diagnosis Description: Palpitations  In Network? Yes Shanna Bolanos  Discharge status: Home  Discharged with meds ? No  Number of ED visits to date: 0  ED Severity:NA     Outreach Information    Outreach successful: Yes 1  Date letter mailed:NA  Date Finalized:11/19/2019    Care Coordination    Follow up appointment with pcp: no no  Transportation issues ? NA    Value Base Outreach    S/W patient who states she is ok  Patient was asked if she needed anything from [facility] - she does not

## 2019-11-21 ENCOUNTER — HOSPITAL ENCOUNTER (EMERGENCY)
Facility: HOSPITAL | Age: 34
Discharge: HOME | End: 2019-11-21
Attending: EMERGENCY MEDICINE | Admitting: EMERGENCY MEDICINE
Payer: COMMERCIAL

## 2019-11-21 ENCOUNTER — APPOINTMENT (EMERGENCY)
Dept: RADIOLOGY | Facility: HOSPITAL | Age: 34
End: 2019-11-21
Payer: COMMERCIAL

## 2019-11-21 VITALS
RESPIRATION RATE: 18 BRPM | SYSTOLIC BLOOD PRESSURE: 138 MMHG | HEART RATE: 58 BPM | OXYGEN SATURATION: 100 % | BODY MASS INDEX: 21.88 KG/M2 | TEMPERATURE: 97.9 F | DIASTOLIC BLOOD PRESSURE: 85 MMHG | WEIGHT: 148.15 LBS

## 2019-11-21 DIAGNOSIS — R00.2 PALPITATIONS: Primary | ICD-10-CM

## 2019-11-21 DIAGNOSIS — F41.9 ANXIETY: ICD-10-CM

## 2019-11-21 DIAGNOSIS — R42 DIZZINESS: ICD-10-CM

## 2019-11-21 DIAGNOSIS — R55 NEAR SYNCOPE: ICD-10-CM

## 2019-11-21 LAB
ALBUMIN SERPL BCP-MCNC: 3.9 G/DL (ref 3.5–5)
ALP SERPL-CCNC: 47 U/L (ref 46–116)
ALT SERPL W P-5'-P-CCNC: 20 U/L (ref 12–78)
AMPHETAMINES SERPL QL SCN: NEGATIVE
ANION GAP SERPL CALCULATED.3IONS-SCNC: 8 MMOL/L (ref 4–13)
APTT PPP: 30 SECONDS (ref 23–37)
AST SERPL W P-5'-P-CCNC: 14 U/L (ref 5–45)
BARBITURATES UR QL: NEGATIVE
BASOPHILS # BLD AUTO: 0.07 THOUSANDS/ΜL (ref 0–0.1)
BASOPHILS NFR BLD AUTO: 1 % (ref 0–1)
BENZODIAZ UR QL: NEGATIVE
BILIRUB SERPL-MCNC: 0.4 MG/DL (ref 0.2–1)
BILIRUB UR QL STRIP: NEGATIVE
BUN SERPL-MCNC: 15 MG/DL (ref 5–25)
CALCIUM SERPL-MCNC: 9 MG/DL (ref 8.3–10.1)
CHLORIDE SERPL-SCNC: 106 MMOL/L (ref 100–108)
CLARITY UR: CLEAR
CO2 SERPL-SCNC: 29 MMOL/L (ref 21–32)
COCAINE UR QL: NEGATIVE
COLOR UR: YELLOW
CREAT SERPL-MCNC: 0.74 MG/DL (ref 0.6–1.3)
D DIMER PPP FEU-MCNC: 0.38 UG/ML FEU
EOSINOPHIL # BLD AUTO: 0.11 THOUSAND/ΜL (ref 0–0.61)
EOSINOPHIL NFR BLD AUTO: 2 % (ref 0–6)
ERYTHROCYTE [DISTWIDTH] IN BLOOD BY AUTOMATED COUNT: 13.2 % (ref 11.6–15.1)
EXT PREG TEST URINE: NEGATIVE
EXT. CONTROL ED NAV: NORMAL
GFR SERPL CREATININE-BSD FRML MDRD: 123 ML/MIN/1.73SQ M
GLUCOSE SERPL-MCNC: 101 MG/DL (ref 65–140)
GLUCOSE UR STRIP-MCNC: NEGATIVE MG/DL
HCG SERPL QL: NEGATIVE
HCT VFR BLD AUTO: 40.3 % (ref 34.8–46.1)
HGB BLD-MCNC: 12.4 G/DL (ref 11.5–15.4)
HGB UR QL STRIP.AUTO: NEGATIVE
IMM GRANULOCYTES # BLD AUTO: 0.01 THOUSAND/UL (ref 0–0.2)
IMM GRANULOCYTES NFR BLD AUTO: 0 % (ref 0–2)
INR PPP: 1.11 (ref 0.84–1.19)
KETONES UR STRIP-MCNC: NEGATIVE MG/DL
LEUKOCYTE ESTERASE UR QL STRIP: NEGATIVE
LYMPHOCYTES # BLD AUTO: 2.51 THOUSANDS/ΜL (ref 0.6–4.47)
LYMPHOCYTES NFR BLD AUTO: 51 % (ref 14–44)
MCH RBC QN AUTO: 27.8 PG (ref 26.8–34.3)
MCHC RBC AUTO-ENTMCNC: 30.8 G/DL (ref 31.4–37.4)
MCV RBC AUTO: 90 FL (ref 82–98)
METHADONE UR QL: NEGATIVE
MONOCYTES # BLD AUTO: 0.28 THOUSAND/ΜL (ref 0.17–1.22)
MONOCYTES NFR BLD AUTO: 6 % (ref 4–12)
NEUTROPHILS # BLD AUTO: 1.97 THOUSANDS/ΜL (ref 1.85–7.62)
NEUTS SEG NFR BLD AUTO: 40 % (ref 43–75)
NITRITE UR QL STRIP: NEGATIVE
NRBC BLD AUTO-RTO: 0 /100 WBCS
OPIATES UR QL SCN: NEGATIVE
PCP UR QL: NEGATIVE
PH UR STRIP.AUTO: 7 [PH] (ref 4.5–8)
PLATELET # BLD AUTO: 206 THOUSANDS/UL (ref 149–390)
PMV BLD AUTO: 11.7 FL (ref 8.9–12.7)
POTASSIUM SERPL-SCNC: 3.6 MMOL/L (ref 3.5–5.3)
PROT SERPL-MCNC: 7.4 G/DL (ref 6.4–8.2)
PROT UR STRIP-MCNC: NEGATIVE MG/DL
PROTHROMBIN TIME: 13.7 SECONDS (ref 11.6–14.5)
RBC # BLD AUTO: 4.46 MILLION/UL (ref 3.81–5.12)
SODIUM SERPL-SCNC: 143 MMOL/L (ref 136–145)
SP GR UR STRIP.AUTO: 1.01 (ref 1–1.03)
THC UR QL: NEGATIVE
TROPONIN I SERPL-MCNC: <0.02 NG/ML
UROBILINOGEN UR QL STRIP.AUTO: 0.2 E.U./DL
WBC # BLD AUTO: 4.95 THOUSAND/UL (ref 4.31–10.16)

## 2019-11-21 PROCEDURE — 93005 ELECTROCARDIOGRAM TRACING: CPT

## 2019-11-21 PROCEDURE — 84703 CHORIONIC GONADOTROPIN ASSAY: CPT | Performed by: EMERGENCY MEDICINE

## 2019-11-21 PROCEDURE — 85025 COMPLETE CBC W/AUTO DIFF WBC: CPT | Performed by: EMERGENCY MEDICINE

## 2019-11-21 PROCEDURE — 80307 DRUG TEST PRSMV CHEM ANLYZR: CPT | Performed by: EMERGENCY MEDICINE

## 2019-11-21 PROCEDURE — 81003 URINALYSIS AUTO W/O SCOPE: CPT

## 2019-11-21 PROCEDURE — 85610 PROTHROMBIN TIME: CPT | Performed by: EMERGENCY MEDICINE

## 2019-11-21 PROCEDURE — 99285 EMERGENCY DEPT VISIT HI MDM: CPT

## 2019-11-21 PROCEDURE — 99283 EMERGENCY DEPT VISIT LOW MDM: CPT | Performed by: EMERGENCY MEDICINE

## 2019-11-21 PROCEDURE — 85379 FIBRIN DEGRADATION QUANT: CPT | Performed by: EMERGENCY MEDICINE

## 2019-11-21 PROCEDURE — 71046 X-RAY EXAM CHEST 2 VIEWS: CPT

## 2019-11-21 PROCEDURE — 36415 COLL VENOUS BLD VENIPUNCTURE: CPT | Performed by: EMERGENCY MEDICINE

## 2019-11-21 PROCEDURE — 81025 URINE PREGNANCY TEST: CPT | Performed by: EMERGENCY MEDICINE

## 2019-11-21 PROCEDURE — 85730 THROMBOPLASTIN TIME PARTIAL: CPT | Performed by: EMERGENCY MEDICINE

## 2019-11-21 PROCEDURE — 84484 ASSAY OF TROPONIN QUANT: CPT | Performed by: EMERGENCY MEDICINE

## 2019-11-21 PROCEDURE — 80053 COMPREHEN METABOLIC PANEL: CPT | Performed by: EMERGENCY MEDICINE

## 2019-11-21 RX ORDER — ALPRAZOLAM 0.25 MG/1
0.25 TABLET ORAL 3 TIMES DAILY PRN
Qty: 15 TABLET | Refills: 0 | Status: SHIPPED | OUTPATIENT
Start: 2019-11-21 | End: 2020-02-11 | Stop reason: ALTCHOICE

## 2019-11-21 RX ORDER — ALPRAZOLAM 0.25 MG/1
0.25 TABLET ORAL ONCE
Status: COMPLETED | OUTPATIENT
Start: 2019-11-21 | End: 2019-11-21

## 2019-11-21 RX ADMIN — ALPRAZOLAM 0.25 MG: 0.25 TABLET ORAL at 02:20

## 2019-11-21 RX ADMIN — SODIUM CHLORIDE 500 ML: 0.9 INJECTION, SOLUTION INTRAVENOUS at 01:29

## 2019-11-21 NOTE — ED PROVIDER NOTES
History  Chief Complaint   Patient presents with    Palpitations     Per EMS patient work up with palpitations and got anxious and short of breath and like she was going to pass out  Pt reports that she is feeling better now  Patient is a 36year old female who awoke tonight with palpitations, sob, near syncope and dizziness and lightheadedness  No vertigo  No chest pain  Was in [place] 1 week ago  (+) increased stress/anxiety  No fever  No N/V  Denies cocaine use  Denies excess caffeine use  Was last seen in this ED on 11/17/19 for palpitations and had normal TSH  LMP - yesterday  [facility] website checked on this patient and no Rx found  History provided by:  Patient and EMS personnel   used: No    Palpitations   Associated symptoms: dizziness and shortness of breath    Associated symptoms: no chest pain, no nausea and no vomiting        Prior to Admission Medications   Prescriptions Last Dose Informant Patient Reported? Taking? Multiple Vitamin (MULTIVITAMIN) capsule  Self Yes No   Sig: Take 1 capsule by mouth daily      Facility-Administered Medications: None       Past Medical History:   Diagnosis Date    [facility]    Migraine        Past Surgical History:   Procedure Laterality Date    AUGMENTATION BREAST Bilateral 2011, 2012    BREAST IMPLANT Bilateral     TONSILLECTOMY         Family History   Problem Relation Age of Onset    Lupus Sister      I have reviewed and agree with the history as documented  Social History     Tobacco Use    Smoking status: Never Smoker    Smokeless tobacco: Never Used   Substance Use Topics    Alcohol use: Yes     Frequency: Monthly or less     Drinks per session: 1 or 2     Binge frequency: Never     Comment: socially    Drug use: Yes     Types: Marijuana     Comment: daily for sleep        Review of Systems   Constitutional: Negative for fever  Respiratory: Positive for shortness of breath  Cardiovascular: Positive for palpitations  Negative for chest pain  Gastrointestinal: Negative for nausea and vomiting  Neurological: Positive for dizziness, syncope (near syncope) and light-headedness  No vertigo  Psychiatric/Behavioral: The patient is nervous/anxious  All other systems reviewed and are negative  Physical Exam  Physical Exam   Constitutional: She is oriented to person, place, and time  She appears well-developed and well-nourished  She appears distressed (moderate)  HENT:   Head: Normocephalic and atraumatic  Mouth/Throat: Oropharynx is clear and moist    Eyes: Pupils are equal, round, and reactive to light  EOM are normal  No scleral icterus  Neck: Normal range of motion  Neck supple  No JVD present  No tracheal deviation present  Cardiovascular: Regular rhythm and normal heart sounds  No murmur heard  Bradycardia  Pulmonary/Chest: Effort normal and breath sounds normal  No stridor  No respiratory distress  She has no wheezes  She has no rales  She exhibits no tenderness  Abdominal: Soft  Bowel sounds are normal  She exhibits no distension  There is no tenderness  Musculoskeletal: She exhibits no edema, tenderness (No calf tenderness) or deformity  Neurological: She is alert and oriented to person, place, and time  Skin: Skin is warm and dry  No rash noted  Psychiatric:   Anxious  Nursing note and vitals reviewed        Vital Signs  ED Triage Vitals [11/21/19 0109]   Temperature Pulse Respirations Blood Pressure SpO2   97 9 °F (36 6 °C) 58 18 138/85 100 %      Temp Source Heart Rate Source Patient Position - Orthostatic VS BP Location FiO2 (%)   Oral Monitor Lying Right arm --      Pain Score       No Pain           Vitals:    11/21/19 0109   BP: 138/85   Pulse: 58   Patient Position - Orthostatic VS: Lying         Visual Acuity      ED Medications  Medications   ALPRAZolam (XANAX) tablet 0 25 mg (has no administration in time range)   sodium chloride 0 9 % bolus 500 mL (0 mL Intravenous Stopped 11/21/19 0215)       Diagnostic Studies  Results Reviewed     Procedure Component Value Units Date/Time    hCG, qualitative pregnancy [[id number]]  (Normal) Collected:  11/21/19 0127    Lab Status:  Final result Specimen:  Blood from Arm, Right Updated:  11/21/19 0206     Preg, Serum Negative    Troponin I [[id number]]  (Normal) Collected:  11/21/19 0127    Lab Status:  Final result Specimen:  Blood from Arm, Right Updated:  11/21/19 0156     Troponin I <0 02 ng/mL     POCT pregnancy, urine [[id number]]  (Normal) Resulted:  11/21/19 0145    Lab Status:  Final result Updated:  11/21/19 0154     EXT PREG TEST UR (Ref: Negative) negative     Control valid    Comprehensive metabolic panel [[id number]] Collected:  11/21/19 0127    Lab Status:  Final result Specimen:  Blood from Arm, Right Updated:  11/21/19 0154     Sodium 143 mmol/L      Potassium 3 6 mmol/L      Chloride 106 mmol/L      CO2 29 mmol/L      ANION GAP 8 mmol/L      BUN 15 mg/dL      Creatinine 0 74 mg/dL      Glucose 101 mg/dL      Calcium 9 0 mg/dL      AST 14 U/L      ALT 20 U/L      Alkaline Phosphatase 47 U/L      Total Protein 7 4 g/dL      Albumin 3 9 g/dL      Total Bilirubin 0 40 mg/dL      eGFR 123 ml/min/1 73sq m     Narrative:       [place] guidelines for Chronic Kidney Disease (CKD):     Stage 1 with normal or high GFR (GFR > 90 mL/min/1 73 square meters)    Stage 2 Mild CKD (GFR = 60-89 mL/min/1 73 square meters)    Stage 3A Moderate CKD (GFR = 45-59 mL/min/1 73 square meters)    Stage 3B Moderate CKD (GFR = 30-44 mL/min/1 73 square meters)    Stage 4 Severe CKD (GFR = 15-29 mL/min/1 73 square meters)    Stage 5 End Stage CKD (GFR <15 mL/min/1 73 square meters)  Note: GFR calculation is accurate only with a steady state creatinine    Rapid drug screen, urine [[id number]]  (Normal) Collected:  11/21/19 0133    Lab Status:  Final result Specimen:  Urine, Clean Catch Updated:  11/21/19 0154     Amph/Meth UR Negative     Barbiturate Ur Negative     Benzodiazepine Urine Negative     Cocaine Urine Negative     Methadone Urine Negative     Opiate Urine Negative     PCP Ur Negative     THC Urine Negative    Narrative:       FOR MEDICAL PURPOSES ONLY  IF CONFIRMATION NEEDED PLEASE CONTACT THE LAB WITHIN 5 DAYS      Drug Screen Cutoff Levels:  AMPHETAMINE/METHAMPHETAMINES  1000 ng/mL  BARBITURATES     200 ng/mL  BENZODIAZEPINES     200 ng/mL  COCAINE      300 ng/mL  METHADONE      300 ng/mL  OPIATES      300 ng/mL  PHENCYCLIDINE     25 ng/mL  THC       50 ng/mL      D-Dimer [[id number]]  (Normal) Collected:  11/21/19 0127    Lab Status:  Final result Specimen:  Blood from Arm, Right Updated:  11/21/19 0150     D-Dimer, Quant 0 38 ug/ml FEU     Protime-INR [[id number]]  (Normal) Collected:  11/21/19 0127    Lab Status:  Final result Specimen:  Blood from Arm, Right Updated:  11/21/19 0148     Protime 13 7 seconds      INR 1 11    APTT [[id number]]  (Normal) Collected:  11/21/19 0127    Lab Status:  Final result Specimen:  Blood from Arm, Right Updated:  11/21/19 0148     PTT 30 seconds     Urine Macroscopic, POC [[id number]] Collected:  11/21/19 0140    Lab Status:  Final result Specimen:  Urine Updated:  11/21/19 0141     Color, UA Yellow     Clarity, UA Clear     pH, UA 7 0     Leukocytes, UA Negative     Nitrite, UA Negative     Protein, UA Negative mg/dl      Glucose, UA Negative mg/dl      Ketones, UA Negative mg/dl      Urobilinogen, UA 0 2 E U /dl      Bilirubin, UA Negative     Blood, UA Negative     Specific Gravity, UA 1 010    Narrative:       CLINITEK RESULT    CBC and differential [[id number]]  (Abnormal) Collected:  11/21/19 0127    Lab Status:  Final result Specimen:  Blood from Arm, Right Updated:  11/21/19 0139     WBC 4 95 Thousand/uL      RBC 4 46 Million/uL      Hemoglobin 12 4 g/dL      Hematocrit 40 3 %      MCV 90 fL      MCH 27 8 pg      MCHC 30 8 g/dL      RDW 13 2 %      MPV 11 7 fL      Platelets 421 Thousands/uL      nRBC 0 /100 WBCs      Neutrophils Relative 40 %      Immat GRANS % 0 %      Lymphocytes Relative 51 %      Monocytes Relative 6 %      Eosinophils Relative 2 %      Basophils Relative 1 %      Neutrophils Absolute 1 97 Thousands/µL      Immature Grans Absolute 0 01 Thousand/uL      Lymphocytes Absolute 2 51 Thousands/µL      Monocytes Absolute 0 28 Thousand/µL      Eosinophils Absolute 0 11 Thousand/µL      Basophils Absolute 0 07 Thousands/µL                  XR chest 2 views   ED Interpretation by Jill Putnam MD (11/21 0211)   Bilateral nipple rings and otherwise no acute disease read by me                 Procedures  ECG 12 Lead Documentation Only  Date/Time: 11/21/2019 1:18 AM  Performed by: Jill Putnam MD  Authorized by: Jill Putnam MD     Indications / Diagnosis:  Near syncope, palpitations, dizziness, sob  ECG reviewed by me, the ED Provider: yes    Patient location:  ED  Previous ECG:     Previous ECG:  Compared to current    Comparison ECG info:  11/17/19    Similarity:  Changes noted (s  savage now)  Rate:     ECG rate:  55    ECG rate assessment: bradycardic    Rhythm:     Rhythm: sinus bradycardia    Ectopy:     Ectopy: none    QRS:     QRS axis:  Normal    QRS intervals:  Normal  Conduction:     Conduction: abnormal      Abnormal conduction: incomplete RBBB    ST segments:     ST segments:  Normal  T waves:     T waves: normal             ED Course  ED Course as of Nov 21 0216   Thu Nov 21, 2019 0213 Labs, EKG, CXR d/w patient  Xanax po ordered  Patient states her fiance is going to pick her up               HEART Risk Score      Most Recent Value   History  0 Filed at: 11/21/2019 0157   ECG  0 Filed at: 11/21/2019 0157   Age  0 Filed at: 11/21/2019 0157   Risk Factors  0 Filed at: 11/21/2019 0157   Troponin  0 Filed at: 11/21/2019 0157   Heart Score Risk Calculator   History  0 Filed at: 11/21/2019 0157   ECG  0 Filed at: 11/21/2019 0157   Age  0 Filed at: 11/21/2019 0157   Risk Factors  0 Filed at: 11/21/2019 0157   Troponin  0 Filed at: 11/21/2019 0157   HEART Score  0 Filed at: 11/21/2019 0157   HEART Score  0 Filed at: 11/21/2019 0157                            MDM  Number of Diagnoses or Management Options  Diagnosis management comments: DDx including but not limited to: metabolic abnormality, cardiac arrhythmia, near syncope, doubt ACS or MI, doubt thyroid disease, PE, anxiety, adverse reaction, pneumonia, PTX, pleural effusion; doubt intracranial process  Amount and/or Complexity of Data Reviewed  Clinical lab tests: ordered and reviewed  Tests in the radiology section of CPT®: ordered and reviewed  Decide to obtain previous medical records or to obtain history from someone other than the patient: yes  Obtain history from someone other than the patient: yes  Review and summarize past medical records: yes  Independent visualization of images, tracings, or specimens: yes        Disposition  Final diagnoses:   Palpitations   Near syncope   Dizziness   Anxiety     Time reflects when diagnosis was documented in both MDM as applicable and the Disposition within this note     Time User Action Codes Description Comment    11/21/2019  1:58 AM Marti Keepers Add [R00 2] Palpitations     11/21/2019  1:58 AM Marti Keepers Add [R55] Near syncope     11/21/2019  1:58 AM Marti Keepers Add [R42] Dizziness     11/21/2019  1:58 AM Marti Keepers Add [F41 9] Anxiety       ED Disposition     ED Disposition Condition Date/Time Comment    Discharge Stable Thu Nov 21, 2019  2:14 AM Jasminomid Saini discharge to home/self care  Follow-up Information     Follow up With Specialties Details Why Contact Info    Mari Check, DO Family Medicine Call in 1 day Return sooner if increased palpitations, fainting, vomiting, fever, worsening difficulty breathing, diarrhea  No driving with [address] [facility] [address]  [phone]            Patient's Medications   Discharge Prescriptions    ALPRAZOLAM (XANAX) 0 25 MG TABLET    Take 1 tablet (0 25 mg total) by mouth 3 (three) times a day as needed for anxiety for up to 5 days       Start Date: 11/21/2019End Date: 11/26/2019       Order Dose: 0 25 mg       Quantity: 15 tablet    Refills: 0     No discharge procedures on file      ED Provider  Electronically Signed by           Eliu Hung MD  11/21/19 3211

## 2019-11-21 NOTE — ED NOTES
Patient transported to [address] [place] [address], [address] [place] [address]  11/21/19 3030

## 2019-11-22 ENCOUNTER — HOSPITAL ENCOUNTER (OUTPATIENT)
Dept: ULTRASOUND IMAGING | Facility: HOSPITAL | Age: 34
Discharge: HOME | End: 2019-11-22
Admitting: RADIOLOGY
Payer: COMMERCIAL

## 2019-11-22 DIAGNOSIS — E04.1 RIGHT THYROID NODULE: ICD-10-CM

## 2019-11-22 LAB
ATRIAL RATE: 61 BPM
P AXIS: 54 DEGREES
PR INTERVAL: 168 MS
QRS AXIS: 50 DEGREES
QRSD INTERVAL: 118 MS
QT INTERVAL: 464 MS
QTC INTERVAL: 444 MS
T WAVE AXIS: 58 DEGREES
VENTRICULAR RATE: 55 BPM

## 2019-11-22 PROCEDURE — 93010 ELECTROCARDIOGRAM REPORT: CPT | Performed by: INTERNAL MEDICINE

## 2019-11-22 PROCEDURE — 88173 CYTOPATH EVAL FNA REPORT: CPT | Performed by: PATHOLOGY

## 2019-11-22 PROCEDURE — 88172 CYTP DX EVAL FNA 1ST EA SITE: CPT | Performed by: PATHOLOGY

## 2019-11-22 PROCEDURE — 10005 FNA BX W/US GDN 1ST LES: CPT

## 2019-11-22 RX ORDER — LIDOCAINE HYDROCHLORIDE 10 MG/ML
5 INJECTION, SOLUTION EPIDURAL; INFILTRATION; INTRACAUDAL; PERINEURAL ONCE
Status: COMPLETED | OUTPATIENT
Start: 2019-11-22 | End: 2019-11-22

## 2019-11-22 RX ADMIN — LIDOCAINE HYDROCHLORIDE 5 ML: 10 INJECTION, SOLUTION EPIDURAL; INFILTRATION; INTRACAUDAL; PERINEURAL at 15:00

## 2019-11-25 ENCOUNTER — TELEPHONE (OUTPATIENT)
Dept: FAMILY MEDICINE CLINIC | Facility: CLINIC | Age: 34
End: 2019-11-25

## 2019-11-25 ENCOUNTER — VBI (OUTPATIENT)
Dept: FAMILY MEDICINE CLINIC | Facility: CLINIC | Age: 34
End: 2019-11-25

## 2019-11-25 DIAGNOSIS — D34 HURTHLE CELL NEOPLASM OF THYROID: Primary | ICD-10-CM

## 2019-11-25 NOTE — TELEPHONE ENCOUNTER
Arvind Aviles    ED Visit Information     Ed visit date: 11/21/2019  Diagnosis Description: Palpitations; Near syncope; Dizziness; Anxiety  In Network? Yes Christ [facility]i  Discharge status: Home  Discharged with meds ? Yes  Number of ED visits to date: 1  ED Severity:NA     Outreach Information    Outreach successful: Yes 1  Date letter mailed:NA  Date Finalized:11/25/2019    Care Coordination    Follow up appointment with pcp: no no  Transportation issues ? NA    Value Base Outreach    11/25/2019 2:24 PM - LMOM  S/W patient who states she is ok  No follow up appointment necessary

## 2019-11-25 NOTE — TELEPHONE ENCOUNTER
Discussed results with patient, explain that her biopsy came back abnormal, malignancy not to be excluded  She will need to f/u with ENT for further eval   Referral entered, pt was encouraged to call office with any questions or concerns   Emely Mathis

## 2019-11-25 NOTE — TELEPHONE ENCOUNTER
She returned your call and would like to be called back at [phone] [facility]

## 2020-02-06 ENCOUNTER — OFFICE VISIT (OUTPATIENT)
Dept: FAMILY MEDICINE CLINIC | Facility: CLINIC | Age: 35
End: 2020-02-06
Payer: COMMERCIAL

## 2020-02-06 ENCOUNTER — CONSULT (OUTPATIENT)
Dept: CARDIOLOGY CLINIC | Facility: CLINIC | Age: 35
End: 2020-02-06
Payer: COMMERCIAL

## 2020-02-06 VITALS
SYSTOLIC BLOOD PRESSURE: 132 MMHG | HEART RATE: 80 BPM | WEIGHT: 147 LBS | DIASTOLIC BLOOD PRESSURE: 80 MMHG | BODY MASS INDEX: 21.77 KG/M2 | HEIGHT: 69 IN

## 2020-02-06 VITALS
RESPIRATION RATE: 18 BRPM | DIASTOLIC BLOOD PRESSURE: 80 MMHG | BODY MASS INDEX: 22.07 KG/M2 | TEMPERATURE: 98.2 F | HEIGHT: 69 IN | SYSTOLIC BLOOD PRESSURE: 130 MMHG | WEIGHT: 149 LBS | HEART RATE: 84 BPM

## 2020-02-06 DIAGNOSIS — E04.1 THYROID NODULE: ICD-10-CM

## 2020-02-06 DIAGNOSIS — R00.2 PALPITATIONS: Primary | ICD-10-CM

## 2020-02-06 DIAGNOSIS — E78.5 DYSLIPIDEMIA: ICD-10-CM

## 2020-02-06 DIAGNOSIS — F41.1 GENERALIZED ANXIETY DISORDER: ICD-10-CM

## 2020-02-06 DIAGNOSIS — I45.10 RBBB: ICD-10-CM

## 2020-02-06 DIAGNOSIS — R94.31 ABNORMAL ECG: ICD-10-CM

## 2020-02-06 DIAGNOSIS — R00.0 TACHYCARDIA: ICD-10-CM

## 2020-02-06 DIAGNOSIS — R00.2 PALPITATION: ICD-10-CM

## 2020-02-06 PROCEDURE — 99244 OFF/OP CNSLTJ NEW/EST MOD 40: CPT | Performed by: INTERNAL MEDICINE

## 2020-02-06 PROCEDURE — 93000 ELECTROCARDIOGRAM COMPLETE: CPT | Performed by: INTERNAL MEDICINE

## 2020-02-06 PROCEDURE — 93000 ELECTROCARDIOGRAM COMPLETE: CPT | Performed by: NURSE PRACTITIONER

## 2020-02-06 PROCEDURE — 1036F TOBACCO NON-USER: CPT | Performed by: NURSE PRACTITIONER

## 2020-02-06 PROCEDURE — 99214 OFFICE O/P EST MOD 30 MIN: CPT | Performed by: NURSE PRACTITIONER

## 2020-02-06 PROCEDURE — 3008F BODY MASS INDEX DOCD: CPT | Performed by: NURSE PRACTITIONER

## 2020-02-06 NOTE — PROGRESS NOTES
Consultation - Cardiology Office  [facility]  Vernal Cutting 36 y o  female MRN: [id number]  : 1985  Unit/Bed#:  Encounter: [id number]      Assessment:     1  Palpitation    2  RBBB    3  Abnormal ECG    4  Tachycardia    5  Generalized anxiety disorder    6  Thyroid nodule    7  Dyslipidemia        Discussion summary and Plan:    1  Palpitations  No clear etiology  Could be PACs and PVCs  Rare PACs were noted on the EKG  Patient also smoked marijuana and she is very anxious  Her heart rate races  Will check Holter monitor to rule out arrhythmia  Echo Doppler to rule out any structural heart disease  2  RBBB  Probably chronic  Will check echo for cardiomyopathy  Patient already scheduled to have Holter monitor    3  History of anxiety disorder  She is on Xanax as needed    4  Thyroid nodule with previous history of hypothyroidism  She is scheduled to have biopsy    5  Tachycardia as above    6  Dyslipidemia  She has high cholesterol with good HDL  Continue dietary control    Patient has abnormal EKG discussed with her  She has incomplete RBBB and some PACs  No other significant abnormal noted  Further plan as also of these tests become available  She also smokes marijuana  She was advised to quit smoking marijuana and avoid stimulants  Lifestyle modification changes discussed with her at length  Thank you for your consultation  If you have any question please call me at [phone]    Counseling :  A description of the counseling  Goals and Barriers  Patient's ability to self care: Yes  Medication side effect reviewed with patient in detail and all their questions answered to their satisfaction  Primary Care Physician Requesting Consult: Alejandra Bonilla DO    Reason for Consult / Principal Problem:  palpitations    HPI :     Vernal Cutting is a 36y o  year old female who was referred by primary care doctor for palpitations    Patient has a past medical history significant for thyroid nodule, previous history of hypothyroidism now stable, anxiety and depression, anemia, history of uterine bleeding now  Was seen by primary care doctor for palpitations and found to have abnormal EKG  EKG shows she has incomplete RBBB with mildly prolonged QTC interval   She gets these palpitations almost every day and then she becomes very anxious and then she feels her heart is racing  She has no dizziness lightheadedness associated with palpitations but she if she bends over sometime and tried to get up quickly she felt dizzy and lightheaded  She does exercise almost 3-4 hours a week which is high intensity without any problems  She has no fever no chills no nausea no vomiting  She does have history of anxiety and was prescribed Xanax today heart rate is 85 beats per minute with incomplete RBBB with QRS duration almost close to  One hundred twenty milliseconds  There is no family history of premature coronary artery disease  She lives with her fiance  She has 2 kids  She believe she is not pregnant but she is also not using any contraceptive methods  She does not smoke does not drink occasionally eats chocolate  She used to smoke marijuana almost every day she has quit or decrease it since she has palpitations  No other drug abuse  Review of Systems   Constitutional: Negative for activity change, chills, diaphoresis, fever and unexpected weight change  HENT: Negative for congestion  Eyes: Negative for discharge and redness  Respiratory: Negative for cough, chest tightness, shortness of breath and wheezing  Cardiovascular: Positive for palpitations  Negative for chest pain and leg swelling  And episodes of tachycardia   Gastrointestinal: Negative for abdominal pain, diarrhea and nausea  Endocrine: Negative  Genitourinary: Negative for decreased urine volume and urgency  Musculoskeletal: Negative    Negative for arthralgias, back pain and gait problem  Skin: Negative for rash and wound  Allergic/Immunologic: Negative  Neurological: Negative for dizziness, seizures, syncope, weakness, light-headedness and headaches  Hematological: Negative  Psychiatric/Behavioral: Negative for agitation and confusion  The patient is nervous/anxious  Historical Information   Past Medical History:   Diagnosis Date    Cervical cancer ([facility] [address] ) 5/25/2017    Migraine      Past Surgical History:   Procedure Laterality Date    ADENOIDECTOMY  9/2010    AUGMENTATION BREAST Bilateral 2011, 2012    BREAST IMPLANT Bilateral     TONSILLECTOMY      US GUIDED THYROID BIOPSY  11/22/2019     Social History     Substance and Sexual Activity   Alcohol Use Yes    Frequency: Monthly or less    Drinks per session: 1 or 2    Binge frequency: Never    Comment: socially     Social History     Substance and Sexual Activity   Drug Use Never    Comment: daily for sleep     Social History     Tobacco Use   Smoking Status Never Smoker   Smokeless Tobacco Never Used     Family History:   Family History   Problem Relation Age of Onset    Lupus Sister     Cancer Paternal Grandmother     Cancer Paternal Grandfather     No Known Problems Mother     Hypertension Father        Meds/Allergies     Allergies   Allergen Reactions    Other Other (See Comments)     Migraine medicine starts w/ a "B" causes dizziness    Sumatriptan      Other reaction(s): Paresthesia  Annotation - [id number]: neck pain       Current Outpatient Medications:     ALPRAZolam (XANAX) 0 25 mg tablet, Take 1 tablet (0 25 mg total) by mouth 3 (three) times a day as needed for anxiety for up to 5 days, Disp: 15 tablet, Rfl: 0    Multiple Vitamin (MULTIVITAMIN) capsule, Take 1 capsule by mouth daily, Disp: , Rfl:     Vitals: Blood pressure 132/80, pulse 80, height 5' 9" (1 753 m), weight 66 7 kg (147 lb), last menstrual period 01/20/2020  Body mass index is 21 71 kg/m²    Vitals: 02/06/20 1537   Weight: 66 7 kg (147 lb)     BP Readings from Last 3 Encounters:   02/06/20 132/80   02/06/20 130/80   11/21/19 138/85         Physical Exam    Neurologic:  Alert & oriented x 3, no new focal deficits, Not in any acute distress,  Constitutional:  Well developed, well nourished, non-toxic appearance   Eyes:  Pupil equal and reacting to light, conjunctiva normal, No JVP, No LNP   HENT:  Atraumatic, oropharynx moist, Neck- normal range of motion, no tenderness,  Neck supple   Respiratory:  Bilateral air entry, mostly clear to auscultation  Cardiovascular: S1-S2 regular with a 2/6 ejection systolic murmur   GI:  Soft, nondistended, normal bowel sounds, nontender, no hepatosplenomegaly appreciated  Musculoskeletal:  No edema, no tenderness, no deformities  Skin:  Well hydrated, no rash   Lymphatic:  No lymphadenopathy noted   Extremities:  No edema and distal pulses are present    Diagnostic Studies Review Cardio:      None recent  EKG:    Twelve lead EKG done in our office shows normal sinus rhythm with sinus arrhythmias  Heart rate 85 beats per minute  QTC is acceptable  Incomplete RBBB/ RBBB with QRS duration almost close to 120 milliseconds        Lab Review   Lab Results   Component Value Date    WBC 4 95 11/21/2019    HGB 12 4 11/21/2019    HCT 40 3 11/21/2019    MCV 90 11/21/2019    RDW 13 2 11/21/2019     11/21/2019     BMP:  Lab Results   Component Value Date    SODIUM 143 11/21/2019    K 3 6 11/21/2019     11/21/2019    CO2 29 11/21/2019    ANIONGAP 4 01/11/2015    BUN 15 11/21/2019    CREATININE 0 74 11/21/2019    GLUC 101 11/21/2019    CALCIUM 9 0 11/21/2019    EGFR 123 11/21/2019     LFT:  Lab Results   Component Value Date    AST 14 11/21/2019    ALT 20 11/21/2019    ALKPHOS 47 11/21/2019    TP 7 4 11/21/2019    ALB 3 9 11/21/2019      Lab Results   Component Value Date    NRP7TFXBKFXZ 3 728 11/17/2019     Lipid Profile:   Lab Results   Component Value Date    CHOLESTEROL 264 (H) 10/24/2019    HDL 89 10/24/2019    LDLCALC 138 (H) 01/11/2015    TRIG 66 10/24/2019     Lab Results   Component Value Date    CHOLESTEROL 264 (H) 10/24/2019    CHOLESTEROL 248 (H) 09/14/2018     Lab Results   Component Value Date    TROPONINI <0 02 11/21/2019           Dr Sarita Daly MD [facility]      "This note has been constructed using a voice recognition system  Therefore there may be syntax, spelling, and/or grammatical errors   Please call if you have any questions  "

## 2020-02-06 NOTE — PROGRESS NOTES
Assessment/Plan:  EKG compared with previous one from nov 2019, non specific changes with RBBB and rate is elevated as compared to previous one and reconfirmed by Dr Robert Jalloh  Advised to go to ER for any worsening  1  Palpitations  -     POCT ECG  -     Ambulatory referral to Cardiology; Future          BMI Counseling: Body mass index is 22 kg/m²  Discussed the patient's BMI with her  Patient Instructions:  Supportive care discussed and advised  Advised to RTO for any worsening and no improvement  Follow up for no improvement and worsening of conditions  Patient advised and educated when to see immediate medical care  Return if symptoms worsen or fail to improve  Future Appointments   Date Time Provider Amy Alcantar   2/6/2020  3:40 PM Navin Manning MD [facility]           Subjective:      Patient ID: Maci Aguillon is a 36 y o  female  Chief Complaint   Patient presents with    Palpitations     symptoms for the past 3 days  rmklpn         Vitals:  /80   Pulse 84   Temp 98 2 °F (36 8 °C)   Resp 18   Ht 5' 9" (1 753 m)   Wt 67 6 kg (149 lb)   LMP 01/20/2020 (Approximate)   BMI 22 00 kg/m²     HPI   Patient stated that having palpitation feelings from couple of days  Denies any sob, chest pain, headache and dizziness  Was seen in ER in nov 2019  Denies any cardiac history        PHQ-9 Depression Screening    PHQ-9:    Frequency of the following problems over the past two weeks:       Little interest or pleasure in doing things:  0 - not at all  Feeling down, depressed, or hopeless:  0 - not at all  Trouble falling or staying asleep, or sleeping too much:  3 - nearly every day  Feeling tired or having little energy:  3 - nearly every day  Poor appetite or overeating:  3 - nearly every day  Feeling bad about yourself - or that you are a failure or have let yourself or your family down:  0 - not at all  Trouble concentrating on things, such as reading the newspaper or watching television:  1 - several days  Moving or speaking so slowly that other people could have noticed  Or the opposite - being so fidgety or restless that you have been moving around a lot more than usual:  0 - not at all  Thoughts that you would be better off dead, or of hurting yourself in some way:  0 - not at all  PHQ-2 Score:  0             The following portions of the patient's history were reviewed and updated as appropriate: allergies, current medications, past family history, past medical history, past social history, past surgical history and problem list       Review of Systems   Constitutional: Negative for chills, diaphoresis, fatigue, fever and unexpected weight change  HENT: Negative for congestion, dental problem, drooling, ear discharge, ear pain, facial swelling, hearing loss, mouth sores, nosebleeds, postnasal drip, rhinorrhea, sinus pressure, sinus pain, sneezing, sore throat, tinnitus, trouble swallowing and voice change  Respiratory: Negative for cough, chest tightness, shortness of breath and wheezing  Cardiovascular: Positive for palpitations  Negative for chest pain and leg swelling  Gastrointestinal: Negative for abdominal pain, constipation, diarrhea, nausea and vomiting  Musculoskeletal: Negative  Skin: Negative  Neurological: Negative for dizziness, weakness, light-headedness and headaches  Hematological: Negative  Objective:    Social History     Tobacco Use   Smoking Status Never Smoker   Smokeless Tobacco Never Used       Allergies:    Allergies   Allergen Reactions    Other Other (See Comments)     Migraine medicine starts w/ a "B" causes dizziness    Sumatriptan      Other reaction(s): Paresthesia  Annotation - [id number]: neck pain         Current Outpatient Medications   Medication Sig Dispense Refill    ALPRAZolam (XANAX) 0 25 mg tablet Take 1 tablet (0 25 mg total) by mouth 3 (three) times a day as needed for anxiety for up to 5 days 15 tablet 0    Multiple Vitamin (MULTIVITAMIN) capsule Take 1 capsule by mouth daily       No current facility-administered medications for this visit  Physical Exam   Constitutional: She appears well-developed and well-nourished  Cardiovascular: Normal rate and regular rhythm  Murmur heard  Pulmonary/Chest: Effort normal and breath sounds normal    Skin: Skin is warm and dry  Psychiatric: She has a normal mood and affect   Her behavior is normal  Judgment and thought content normal                    Johnie Nyhan, CRNP

## 2020-02-11 ENCOUNTER — TELEPHONE (OUTPATIENT)
Dept: FAMILY MEDICINE CLINIC | Facility: CLINIC | Age: 35
End: 2020-02-11

## 2020-02-11 ENCOUNTER — OFFICE VISIT (OUTPATIENT)
Dept: FAMILY MEDICINE CLINIC | Facility: CLINIC | Age: 35
End: 2020-02-11
Payer: COMMERCIAL

## 2020-02-11 VITALS
DIASTOLIC BLOOD PRESSURE: 84 MMHG | HEIGHT: 69 IN | WEIGHT: 146.6 LBS | OXYGEN SATURATION: 96 % | BODY MASS INDEX: 21.71 KG/M2 | RESPIRATION RATE: 16 BRPM | TEMPERATURE: 100 F | HEART RATE: 86 BPM | SYSTOLIC BLOOD PRESSURE: 140 MMHG

## 2020-02-11 DIAGNOSIS — F41.9 ANXIETY: Primary | ICD-10-CM

## 2020-02-11 PROCEDURE — 3008F BODY MASS INDEX DOCD: CPT | Performed by: NURSE PRACTITIONER

## 2020-02-11 PROCEDURE — 1036F TOBACCO NON-USER: CPT | Performed by: NURSE PRACTITIONER

## 2020-02-11 PROCEDURE — 99214 OFFICE O/P EST MOD 30 MIN: CPT | Performed by: NURSE PRACTITIONER

## 2020-02-11 RX ORDER — PROPRANOLOL HYDROCHLORIDE 20 MG/1
TABLET ORAL
Qty: 30 TABLET | Refills: 0 | Status: SHIPPED | OUTPATIENT
Start: 2020-02-11 | End: 2020-04-22 | Stop reason: ALTCHOICE

## 2020-02-11 NOTE — PROGRESS NOTES
Assessment/Plan:    Counseling and coordination of care greater than 50% of visit, total time spent 25 minutes  1  Anxiety  -     sertraline (ZOLOFT) 50 mg tablet; Take 1 tablet (50 mg total) by mouth daily  -     propranolol (INDERAL) 20 mg tablet; Take one tablet 60 to 90 minutes prior to anxiety-provoking event or for panic attacks as needed twice a day          BMI Counseling: Body mass index is 21 65 kg/m²  Discussed the patient's BMI with her  Patient Instructions:  Supportive care discussed and advised  Advised to RTO for any worsening and no improvement  Follow up for no improvement and worsening of conditions  Patient advised and educated when to see immediate medical care  Return in about 1 month (around 3/11/2020), or if symptoms worsen or fail to improve  Future Appointments   Date Time Provider Amy Alcantar   2/26/2020  9:00 AM [place] E[place] [address]   2/26/2020 11:00 AM [place] HOLTER [address]   3/2/2020  1:00 PM [facility] Michael Donovan [address]   3/12/2020  9:00 AM DO YESENIA Lui [facility]           Subjective:      Patient ID: Ann Christianson is a 36 y o  female  Chief Complaint   Patient presents with    Anxiety     vfrma         Vitals:  /84   Pulse 86   Temp 100 °F (37 8 °C)   Resp 16   Ht 5' 9" (1 753 m)   Wt 66 5 kg (146 lb 9 6 oz)   LMP 01/20/2020 (Approximate)   SpO2 96%   BMI 21 65 kg/m²     HPI  Patient is here to follow up on her anxiety  Stated that her anxiety is lately getting worse  Feeling palpitations but denies any sob and chest pain  Stated that stopped smoking marijuana  zoloft was prescribed last may 2019 but never started it and under therapy  Denies any suicidal ideation and thoughts  accompanied with friend                    The following portions of the patient's history were reviewed and updated as appropriate: allergies, current medications, past family history, past medical history, past social history, past surgical history and problem list       Review of Systems   Constitutional: Negative  Respiratory: Negative  Cardiovascular: Positive for palpitations  Gastrointestinal: Negative  Genitourinary: Negative  Musculoskeletal: Negative  Neurological: Negative  Psychiatric/Behavioral: Positive for decreased concentration  Negative for agitation, behavioral problems, confusion, dysphoric mood, hallucinations, self-injury, sleep disturbance and suicidal ideas  The patient is nervous/anxious  The patient is not hyperactive  Objective:    Social History     Tobacco Use   Smoking Status Never Smoker   Smokeless Tobacco Never Used       Allergies: Allergies   Allergen Reactions    Other Other (See Comments)     Migraine medicine starts w/ a "B" causes dizziness    Sumatriptan      Other reaction(s): Paresthesia  Annotation - [id number]: neck pain         Current Outpatient Medications   Medication Sig Dispense Refill    ALPRAZolam (XANAX) 0 25 mg tablet Take 1 tablet (0 25 mg total) by mouth 3 (three) times a day as needed for anxiety for up to 5 days 15 tablet 0    Multiple Vitamin (MULTIVITAMIN) capsule Take 1 capsule by mouth daily      propranolol (INDERAL) 20 mg tablet Take one tablet 60 to 90 minutes prior to anxiety-provoking event or for panic attacks as needed twice a day 30 tablet 0    sertraline (ZOLOFT) 50 mg tablet Take 1 tablet (50 mg total) by mouth daily 30 tablet 1     No current facility-administered medications for this visit  Physical Exam   Constitutional: She is oriented to person, place, and time  She appears well-developed and well-nourished  Cardiovascular: Normal rate and regular rhythm  Murmur heard  Pulmonary/Chest: Effort normal and breath sounds normal    Neurological: She is alert and oriented to person, place, and time  Skin: Skin is warm and dry  Psychiatric: She has a normal mood and affect   Her behavior is normal  Judgment and thought content normal                    HAMIDA Mccann

## 2020-02-11 NOTE — PATIENT INSTRUCTIONS
Supportive care discussed and advised  Advised to RTO for any worsening and no improvement  Follow up for no improvement and worsening of conditions  Patient advised and educated when to see immediate medical care  Sertraline (By mouth)   Sertraline (SER-tra-maria c)  Treats depression, obsessive-compulsive disorder (OCD), posttraumatic stress disorder (PTSD), premenstrual dysphoric disorder (PMDD), social anxiety disorder, and panic disorder  This medicine is an SSRI  Brand Name(s): Zoloft   There may be other brand names for this medicine  When This Medicine Should Not Be Used: This medicine is not right for everyone  Do not use it if you had an allergic reaction to sertraline  How to Use This Medicine:   Liquid, Tablet  · Take your medicine as directed  Your dose may need to be changed several times to find what works best for you  You may need to take it for a few weeks or months before you feel better  · Oral liquid: Use the dropper provided to remove the medicine and mix it with 1/2 cup (4 ounces) of water, ginger ale, lemon-lime soda, lemonade, or orange juice  Drink the mixture right away  It is normal for it to look a bit hazy  · This medicine should come with a Medication Guide  Ask your pharmacist for a copy if you do not have one  · Missed dose: Take a dose as soon as you remember  If it is almost time for your next dose, wait until then and take a regular dose  Do not take extra medicine to make up for a missed dose  · Store the medicine in a closed container at room temperature, away from heat, moisture, and direct light  Drugs and Foods to Avoid:   Ask your doctor or pharmacist before using any other medicine, including over-the-counter medicines, vitamins, and herbal products  · Do not use this medicine together with pimozide  Do not use this medicine and an MAO inhibitor (MAOI) within 14 days of each other   Do not use the oral liquid form of sertraline if you are also using disulfiram   · Some medicines can affect how sertraline works  Tell your doctor if you are using the following:   ¨ Buspirone, cimetidine, cisapride, diazepam, digitoxin, fentanyl, flecainide, lithium, phenytoin, propafenone, Behzad's wort, tramadol, tryptophan supplements, or valproate  ¨ A blood thinner (such as warfarin), a diuretic (water pill), an NSAID pain or arthritis medicine (such as aspirin, diclofenac, ibuprofen), a tricyclic antidepressant, a triptan medicine for migraine headaches  · Do not drink alcohol while you are using this medicine  Warnings While Using This Medicine:   · Tell your doctor if you are pregnant or breastfeeding, or if you have liver disease, bleeding problems, glaucoma, heart disease, or a seizure disorder  · For some children, teenagers, and young adults, this medicine may increase mental or emotional problems  This may lead to thoughts of suicide and violence  Talk with your doctor right away if you have any thoughts or behavior changes that concern you  Tell your doctor if you or anyone in your family has a history of bipolar disorder or suicide attempts  · This medicine may cause the following problems:   ¨ Serotonin syndrome (when taken with certain medicines)  ¨ Low sodium levels (more common in elderly patients and those who take diuretics or become dehydrated)  · Tell your doctor if you are sensitive to latex, because the oral liquid comes with a latex rubber dropper  · This medicine may make you dizzy or drowsy  Do not drive or do anything that could be dangerous until you know how this medicine affects you  · Do not stop using this medicine suddenly  Your doctor will need to slowly decrease your dose before you stop it completely  · Your doctor will check your progress and the effects of this medicine at regular visits  Keep all appointments  · Keep all medicine out of the reach of children  Never share your medicine with anyone    Possible Side Effects While Using This Medicine:   Call your doctor right away if you notice any of these side effects:  · Allergic reaction: Itching or hives, swelling in your face or hands, swelling or tingling in your mouth or throat, chest tightness, trouble breathing  · Anxiety, restlessness, fast heartbeat, fever, sweating, muscle spasms, twitching, nausea, vomiting, diarrhea, seeing or hearing things that are not there  · Blistering, peeling, or red skin rash  · Confusion, weakness, and muscle twitching  · Eye pain, vision changes, seeing halos around lights  · Feeling more excited or energetic than usual  · Thoughts of hurting yourself or others, unusual behavior  · Unusual bleeding or bruising  If you notice these less serious side effects, talk with your doctor:   · Dry mouth  · Loss of appetite, weight loss  · Mild diarrhea, constipation, nausea, vomiting  · Sexual problems  · Sleepiness, or trouble sleeping  If you notice other side effects that you think are caused by this medicine, tell your doctor  Call your doctor for medical advice about side effects  You may report side effects to FDA at 7-164-FDA-3744  © 2017 [employer] Information is for End User's use only and may not be sold, redistributed or otherwise used for commercial purposes  The above information is an  only  It is not intended as medical advice for individual conditions or treatments  Talk to your doctor, nurse or pharmacist before following any medical regimen to see if it is safe and effective for you

## 2020-02-11 NOTE — TELEPHONE ENCOUNTER
Spoke with pt, states her anxiety is really bad today, states she has one xanax left from the ER, advised her to take it  States she has not been taking her zoloft, told her not to start it today as it is not fast acting     Pt will have fiance drive her to her appt at 1:15  [facility]

## 2020-02-11 NOTE — TELEPHONE ENCOUNTER
Junie Crump was here with Barrett Donahue for heart Palps, she is calling today with worse anxiety  Can we please call her back to triage  She did  said she was fine waiting for an apt this afternoon w/ K[facility]      Thank you

## 2020-02-12 ENCOUNTER — TELEPHONE (OUTPATIENT)
Dept: FAMILY MEDICINE CLINIC | Facility: CLINIC | Age: 35
End: 2020-02-12

## 2020-02-12 ENCOUNTER — HOSPITAL ENCOUNTER (EMERGENCY)
Facility: HOSPITAL | Age: 35
Discharge: HOME | End: 2020-02-12
Attending: EMERGENCY MEDICINE
Payer: COMMERCIAL

## 2020-02-12 VITALS
SYSTOLIC BLOOD PRESSURE: 134 MMHG | WEIGHT: 149.91 LBS | TEMPERATURE: 98.1 F | RESPIRATION RATE: 18 BRPM | BODY MASS INDEX: 22.14 KG/M2 | OXYGEN SATURATION: 98 % | DIASTOLIC BLOOD PRESSURE: 75 MMHG | HEART RATE: 80 BPM

## 2020-02-12 DIAGNOSIS — F41.9 ANXIETY: Primary | ICD-10-CM

## 2020-02-12 PROCEDURE — 99283 EMERGENCY DEPT VISIT LOW MDM: CPT

## 2020-02-12 PROCEDURE — 99284 EMERGENCY DEPT VISIT MOD MDM: CPT | Performed by: NURSE PRACTITIONER

## 2020-02-12 RX ORDER — ALPRAZOLAM 0.25 MG/1
0.25 TABLET ORAL ONCE
Status: COMPLETED | OUTPATIENT
Start: 2020-02-12 | End: 2020-02-12

## 2020-02-12 RX ORDER — ALPRAZOLAM 1 MG/1
0.5 TABLET ORAL 2 TIMES DAILY PRN
Qty: 10 TABLET | Refills: 0 | Status: SHIPPED | OUTPATIENT
Start: 2020-02-12 | End: 2020-03-12 | Stop reason: ALTCHOICE

## 2020-02-12 RX ORDER — ALPRAZOLAM 0.25 MG/1
0.5 TABLET ORAL ONCE
Status: COMPLETED | OUTPATIENT
Start: 2020-02-12 | End: 2020-02-12

## 2020-02-12 RX ADMIN — ALPRAZOLAM 0.25 MG: 0.25 TABLET ORAL at 17:22

## 2020-02-12 RX ADMIN — ALPRAZOLAM 0.5 MG: 0.25 TABLET ORAL at 20:01

## 2020-02-12 NOTE — TELEPHONE ENCOUNTER
Patient calling tearful that she is having a panick attack    Was just here yesterday to see Noe Fitzpatrick and was given new medication and she doesn't think it is working    Triage to [facility]

## 2020-02-12 NOTE — TELEPHONE ENCOUNTER
Spoke to pt says medication is not helping, she took 2 pills and she had a panic attack while sleeping and she also had one now   Please advise    Talisha Alvarenga MA

## 2020-02-12 NOTE — TELEPHONE ENCOUNTER
If feeling worse, can go to ER and continue taking zoloft and take propanolol as needed  Can increase propanolol to 3 times a day as needed   HAMIDA Gates

## 2020-02-12 NOTE — ED NOTES
1) Warm compresses 20 minute three times daily for a week.   2) Non direct ice compresses 20 minutes three times daily for a week.   3) Advil 800 mg by mouth three times daily for a week with food.   4) Stretches.    Patient transported back from [address] [place] [address], 3 [facility]  02/12/20 2883

## 2020-02-12 NOTE — ED PROVIDER NOTES
History  Chief Complaint   Patient presents with    Anxiety     pt states she has hx of anxiety, stated she had a panic attack earlier today  pt state she has increased anxiety for past week  pt was given propanolol yesterday but has not had relief so she came here  pt state she was seen here before for same thing and given xanax with relief  This is a 31yo female with history of anxiety coming in today complaining of excessive anxiety and a "panic attack" this morning    She states that for the past 2 5 weeks she has been dealing with ongoing anxiety and associated palpitations  Her anxiety continues today and worsened to the point that she felt that she needed to come into the ER to seek immediate treatment  She states that her panic attack concerned her this morning  Panic attack consisted of excessive anxiety with shortness of breath and crying  She denies any particular stimulus causing anxiety, but she does mention that planning a wedding, the anniversary of a family member's death, and Pardeep Ding's death; are not helping her current mental state  She was previously seen in the ER back in November for anxiety and treated with Xanax, which she claims helped her symptoms  She does see a therapist, regularly, who was unavailable for the month of February  She saw her primary care provider yesterday prescribed her Zoloft for anxiety and propanolol for palpatations  She denies suicidal/homicidal ideation  Prior to Admission Medications   Prescriptions Last Dose Informant Patient Reported? Taking?    Multiple Vitamin (MULTIVITAMIN) capsule  Self Yes No   Sig: Take 1 capsule by mouth daily   propranolol (INDERAL) 20 mg tablet   No No   Sig: Take one tablet 60 to 90 minutes prior to anxiety-provoking event or for panic attacks as needed twice a day   sertraline (ZOLOFT) 50 mg tablet   No No   Sig: Take 1 tablet (50 mg total) by mouth daily      Facility-Administered Medications: None       Past Medical History:   Diagnosis Date    Cervical cancer ([facility] [address] ) 5/25/2017    Migraine        Past Surgical History:   Procedure Laterality Date    ADENOIDECTOMY  9/2010    AUGMENTATION BREAST Bilateral 2011, 2012    BREAST IMPLANT Bilateral     TONSILLECTOMY      US GUIDED THYROID BIOPSY  11/22/2019       Family History   Problem Relation Age of Onset    Lupus Sister     Cancer Paternal Grandmother     Cancer Paternal Grandfather     No Known Problems Mother     Hypertension Father      I have reviewed and agree with the history as documented  Social History     Tobacco Use    Smoking status: Never Smoker    Smokeless tobacco: Never Used   Substance Use Topics    Alcohol use: Not Currently     Frequency: Monthly or less     Drinks per session: 1 or 2     Binge frequency: Never     Comment: socially    Drug use: Never     Comment: daily for sleep       Review of Systems   Constitutional: Positive for appetite change  Negative for activity change, chills, fatigue and fever  Respiratory: Negative for cough, chest tightness, shortness of breath and wheezing  Cardiovascular: Positive for palpitations  Negative for chest pain  Neurological: Negative for dizziness, speech difficulty, weakness, light-headedness, numbness and headaches  Psychiatric/Behavioral: Positive for sleep disturbance  Negative for confusion, decreased concentration, hallucinations and suicidal ideas  The patient is nervous/anxious and is hyperactive  Physical Exam  Physical Exam   Constitutional: She appears well-developed and well-nourished  No distress  HENT:   Head: Normocephalic and atraumatic  Eyes: Pupils are equal, round, and reactive to light  EOM are normal    Cardiovascular: Normal rate, regular rhythm, normal heart sounds and intact distal pulses  Pulmonary/Chest: Effort normal and breath sounds normal  No respiratory distress  Skin: Skin is warm  Capillary refill takes less than 2 seconds  Psychiatric: Her behavior is normal  Judgment and thought content normal  Her mood appears anxious  Her affect is not angry and not inappropriate  Her speech is rapid and/or pressured  Her speech is not delayed, not tangential and not slurred  Cognition and memory are normal  She does not exhibit a depressed mood  She is communicative  Patient is observed needing her hands, frantically putting on chapstick, and rocking back and forth  Vital Signs  ED Triage Vitals   Temperature Pulse Respirations Blood Pressure SpO2   02/12/20 1612 02/12/20 1611 02/12/20 1611 02/12/20 1611 02/12/20 1611   98 1 °F (36 7 °C) 67 18 161/87 100 %      Temp Source Heart Rate Source Patient Position - Orthostatic VS BP Location FiO2 (%)   02/12/20 1612 02/12/20 1611 02/12/20 1611 02/12/20 1611 --   Oral Monitor Sitting Left arm       Pain Score       02/12/20 1611       No Pain           Vitals:    02/12/20 1611 02/12/20 1957   BP: 161/87 134/75   Pulse: 67 80   Patient Position - Orthostatic VS: Sitting Sitting         Visual Acuity      ED Medications  Medications   ALPRAZolam (XANAX) tablet 0 25 mg (0 25 mg Oral Given 2/12/20 1722)   ALPRAZolam (XANAX) tablet 0 5 mg (0 5 mg Oral Given 2/12/20 2001)       Diagnostic Studies  Results Reviewed     None                 No orders to display              Procedures  ECG 12 Lead Documentation Only  Date/Time: 2/12/2020 7:50 PM  Performed by: HAMIDA Cochran  Authorized by: HAMIDA Cochran     Indications / Diagnosis:  Palpatations  ECG reviewed by me, the ED Provider: yes    Patient location:  ED  Previous ECG:     Previous ECG:  Compared to current    Similarity:  No change    Comparison to cardiac monitor: No    Rate:     ECG rate assessment: normal    Rhythm:     Rhythm: sinus rhythm    Ectopy:     Ectopy: none    QRS:     QRS axis:  Right (Right incomplete bundle branch block )    QRS intervals:   Wide  Conduction:     Conduction: normal    ST segments:     ST segments: Normal  T waves:     T waves: normal               ED Course  ED Course as of Feb 12 2047   Wed Feb 12, 2020   0367 On reassessment, patient states that she still feels anxious                                  MDM  Number of Diagnoses or Management Options  Anxiety: new and requires workup  Diagnosis management comments: This is a 28-year-old female with history anxiety who was seen yesterday at her primary care providers office and provided Zoloft and propanolol to alleviate her anxiety and associated palpitations  Being that Zoloft needs time to build up in the system short-term anxiety treatment will be provided  Discussed short-term treatment options with patient and possible admission to hospital if short-term treatment is ineffective  Patient states that she is not interested in psychiatric admission at this time  On reassessment of anxiety, patient states that she is feeling much better and is ready for discharge  EKG is unchanged, discussed with patient that she can continue to take propanolol t i d  as PCP recommended  Based on history, suspicion for thyroid condition is low  Will treat patient with acute anti anxiety medication and allow Zoloft to reach therapeutic dose and patient to follow-up with therapist   Patient was educated on safety regarding short-acting anti anxiety medications, specifically but not limited: to not driving or operating heavy machine, not participating in any activity that requires keen/rapid judgment  Patient agrees with plan of care and states that she has no questions at this time      Patient Progress  Patient progress: improved        Disposition  Final diagnoses:   Anxiety     Time reflects when diagnosis was documented in both MDM as applicable and the Disposition within this note     Time User Action Codes Description Comment    2/12/2020  7:57 PM Pedrito Null Add [F41 9] Anxiety       ED Disposition     ED Disposition Condition Date/Time Comment    Discharge Stable Wed Feb 12, 2020  8:32 PM Vernal Cutting discharge to home/self care  Follow-up Information     Follow up With Specialties Details Why Contact Info Additional Information    Alejandra Bonilla,  Family Medicine Schedule an appointment as soon as possible for a visit in 1 day  [address] [facility] [place]  [phone]       Personal therapist   Schedule an appointment as soon as possible for a visit in 2 days       [facility] Emergency Medicine  As needed, If symptoms worsen [address] [facility]  AN ED, [address], [place], [place], [address]          Discharge Medication List as of 2/12/2020  8:33 PM      START taking these medications    Details   ALPRAZolam Davene Novel) 1 mg tablet Take 0 5 tablets (0 5 mg total) by mouth 2 (two) times a day as needed for anxiety for up to 10 days, Starting Wed 2/12/2020, Until Sat 2/22/2020, Normal         CONTINUE these medications which have NOT CHANGED    Details   Multiple Vitamin (MULTIVITAMIN) capsule Take 1 capsule by mouth daily, Historical Med      propranolol (INDERAL) 20 mg tablet Take one tablet 60 to 90 minutes prior to anxiety-provoking event or for panic attacks as needed twice a day, Normal      sertraline (ZOLOFT) 50 mg tablet Take 1 tablet (50 mg total) by mouth daily, Starting Tue 2/11/2020, Until Sat 4/11/2020, Normal           No discharge procedures on file      PDMP Review       Value Time User    PDMP Reviewed  Yes 2/12/2020  7:59 PM Louisa Mendoza          ED Provider  Electronically Signed by           HAMIDA Mendoza  02/12/20 2034       Louisa Mendoza  02/12/20 2047

## 2020-02-13 LAB
ATRIAL RATE: 60 BPM
P AXIS: 64 DEGREES
PR INTERVAL: 142 MS
QRS AXIS: 44 DEGREES
QRSD INTERVAL: 116 MS
QT INTERVAL: 444 MS
QTC INTERVAL: 444 MS
T WAVE AXIS: 61 DEGREES
VENTRICULAR RATE: 60 BPM

## 2020-02-13 PROCEDURE — 93010 ELECTROCARDIOGRAM REPORT: CPT | Performed by: INTERNAL MEDICINE

## 2020-02-13 NOTE — ED NOTES
Pt reports she was feeling better but is getting anxious again     Moriah Maldonado RN  02/12/20 1920

## 2020-02-14 ENCOUNTER — VBI (OUTPATIENT)
Dept: FAMILY MEDICINE CLINIC | Facility: CLINIC | Age: 35
End: 2020-02-14

## 2020-02-14 NOTE — TELEPHONE ENCOUNTER
Meghna Pierre    ED Visit Information     Ed visit date: 02/12/2020  Diagnosis Description: Anxiety  In Network? Yes Karla Reny  Discharge status: Home  Discharged with meds ? Yes  Number of ED visits to date: 2  ED Severity:NA     Outreach Information    Outreach successful: Yes 1  Date letter mailed:NA  Date Finalized:02/14/2020    Care Coordination    Follow up appointment with pcp: no no  Transportation issues ? NA    Value Base Outreach    02/14/2020 9:56 AM - LMOM  S/W patient who states she is feeling better

## 2020-02-26 ENCOUNTER — HOSPITAL ENCOUNTER (OUTPATIENT)
Dept: NON INVASIVE DIAGNOSTICS | Facility: HOSPITAL | Age: 35
Discharge: HOME | End: 2020-02-26
Attending: INTERNAL MEDICINE
Payer: COMMERCIAL

## 2020-02-26 DIAGNOSIS — I45.10 RBBB: ICD-10-CM

## 2020-02-26 DIAGNOSIS — R00.2 PALPITATION: ICD-10-CM

## 2020-02-26 DIAGNOSIS — R00.0 TACHYCARDIA: ICD-10-CM

## 2020-02-26 DIAGNOSIS — R94.31 ABNORMAL ECG: ICD-10-CM

## 2020-02-26 PROCEDURE — 93226 XTRNL ECG REC<48 HR SCAN A/R: CPT

## 2020-02-26 PROCEDURE — 93306 TTE W/DOPPLER COMPLETE: CPT

## 2020-02-26 PROCEDURE — 93225 XTRNL ECG REC<48 HRS REC: CPT

## 2020-02-27 PROCEDURE — 93306 TTE W/DOPPLER COMPLETE: CPT | Performed by: INTERNAL MEDICINE

## 2020-02-28 ENCOUNTER — TELEPHONE (OUTPATIENT)
Dept: CARDIOLOGY CLINIC | Facility: CLINIC | Age: 35
End: 2020-02-28

## 2020-02-28 NOTE — TELEPHONE ENCOUNTER
----- Message from Janine Kyle MD sent at 2/27/2020  4:07 PM EST -----  Patient's echo shows normal LV systolic function  No significant valvular disease  Patient can keep an appointment  Please call patient about echo report

## 2020-03-02 ENCOUNTER — HOSPITAL ENCOUNTER (OUTPATIENT)
Dept: NON INVASIVE DIAGNOSTICS | Facility: HOSPITAL | Age: 35
Discharge: HOME | End: 2020-03-02
Attending: INTERNAL MEDICINE
Payer: COMMERCIAL

## 2020-03-02 DIAGNOSIS — I45.10 RBBB: ICD-10-CM

## 2020-03-02 DIAGNOSIS — R94.31 ABNORMAL ECG: ICD-10-CM

## 2020-03-02 DIAGNOSIS — R00.2 PALPITATION: ICD-10-CM

## 2020-03-02 DIAGNOSIS — R00.0 TACHYCARDIA: ICD-10-CM

## 2020-03-02 PROCEDURE — 93018 CV STRESS TEST I&R ONLY: CPT | Performed by: INTERNAL MEDICINE

## 2020-03-02 PROCEDURE — 93017 CV STRESS TEST TRACING ONLY: CPT

## 2020-03-02 PROCEDURE — 93016 CV STRESS TEST SUPVJ ONLY: CPT | Performed by: INTERNAL MEDICINE

## 2020-03-03 ENCOUNTER — TELEPHONE (OUTPATIENT)
Dept: CARDIOLOGY CLINIC | Facility: CLINIC | Age: 35
End: 2020-03-03

## 2020-03-03 LAB
CHEST PAIN STATEMENT: NORMAL
MAX DIASTOLIC BP: 82 MMHG
MAX HEART RATE: 187 BPM
MAX PREDICTED HEART RATE: 187 BPM
MAX. SYSTOLIC BP: 174 MMHG
PROTOCOL NAME: NORMAL
REASON FOR TERMINATION: NORMAL
TARGET HR FORMULA: NORMAL
TEST INDICATION: NORMAL
TIME IN EXERCISE PHASE: NORMAL

## 2020-03-03 NOTE — TELEPHONE ENCOUNTER
----- Message from Sara Nieto MD sent at 3/2/2020  6:52 PM EST -----  Pt's Patient's stress test is normal    Patient can keep regular appointment  Please call patient with the result

## 2020-03-10 ENCOUNTER — TELEPHONE (OUTPATIENT)
Dept: CARDIOLOGY CLINIC | Facility: CLINIC | Age: 35
End: 2020-03-10

## 2020-03-10 PROCEDURE — 93227 XTRNL ECG REC<48 HR R&I: CPT | Performed by: INTERNAL MEDICINE

## 2020-03-10 RX ORDER — DIAZEPAM 5 MG/1
TABLET ORAL
COMMUNITY
Start: 2020-02-20 | End: 2020-03-12 | Stop reason: ALTCHOICE

## 2020-03-10 NOTE — TELEPHONE ENCOUNTER
----- Message from Amirah Abarca MD sent at 3/10/2020 12:52 PM EDT -----  Pt's Holter shows no significant arrhthymias  Pt can keep his appointment  Please call patient

## 2020-03-12 ENCOUNTER — TELEPHONE (OUTPATIENT)
Dept: FAMILY MEDICINE CLINIC | Facility: CLINIC | Age: 35
End: 2020-03-12

## 2020-03-12 ENCOUNTER — OFFICE VISIT (OUTPATIENT)
Dept: FAMILY MEDICINE CLINIC | Facility: CLINIC | Age: 35
End: 2020-03-12
Payer: COMMERCIAL

## 2020-03-12 VITALS
RESPIRATION RATE: 16 BRPM | TEMPERATURE: 97.4 F | BODY MASS INDEX: 21.03 KG/M2 | HEART RATE: 72 BPM | SYSTOLIC BLOOD PRESSURE: 116 MMHG | HEIGHT: 69 IN | WEIGHT: 142 LBS | DIASTOLIC BLOOD PRESSURE: 70 MMHG

## 2020-03-12 DIAGNOSIS — F41.1 GENERALIZED ANXIETY DISORDER: Primary | ICD-10-CM

## 2020-03-12 PROCEDURE — 99213 OFFICE O/P EST LOW 20 MIN: CPT | Performed by: FAMILY MEDICINE

## 2020-03-12 PROCEDURE — 1036F TOBACCO NON-USER: CPT | Performed by: FAMILY MEDICINE

## 2020-03-12 PROCEDURE — 3008F BODY MASS INDEX DOCD: CPT | Performed by: FAMILY MEDICINE

## 2020-03-12 RX ORDER — SERTRALINE HYDROCHLORIDE 100 MG/1
100 TABLET, FILM COATED ORAL DAILY
Qty: 90 TABLET | Refills: 1 | Status: SHIPPED | OUTPATIENT
Start: 2020-03-12 | End: 2020-09-19

## 2020-03-12 NOTE — TELEPHONE ENCOUNTER
If pt has no recent travel or exposure to anyone with known COVID-19, have her come in for an appointment tomorrow  It is likely a viral URI, but if she is not feeling better or symptoms worsen, she should be evaluated  In the meantime suggest supportive care including rest, hydration, tylenol for body aches/fevers, and OTC decongestants/cough suppressants

## 2020-03-12 NOTE — TELEPHONE ENCOUNTER
[place] is low risk for COVID-19  She can come in for a visit to be tested for strep if she is concerned and has a sore throat

## 2020-03-12 NOTE — PROGRESS NOTES
Assessment/Plan:    1  Generalized anxiety disorder  Assessment & Plan:  Improved but not at goal  Dose of sertraline increased from 50 to 100    Orders:  -     sertraline (ZOLOFT) 100 mg tablet; Take 1 tablet (100 mg total) by mouth daily    2  Anxiety         There are no Patient Instructions on file for this visit  Return in about 6 weeks (around 4/23/2020) for Next scheduled follow up  Subjective:      Patient ID: Ramona Jeffery is a 36 y o  female  Chief Complaint   Patient presents with    Follow-up     medication ac/cma        She thinks her anxiety has been better with the sertraline  She has not been taking the Xanax that was prescribed  She still has some remaining anxiety  The following portions of the patient's history were reviewed and updated as appropriate:  past social history    Review of Systems      Current Outpatient Medications   Medication Sig Dispense Refill    Multiple Vitamin (MULTIVITAMIN) capsule Take 1 capsule by mouth daily      propranolol (INDERAL) 20 mg tablet Take one tablet 60 to 90 minutes prior to anxiety-provoking event or for panic attacks as needed twice a day 30 tablet 0    sertraline (ZOLOFT) 100 mg tablet Take 1 tablet (100 mg total) by mouth daily 90 tablet 1     No current facility-administered medications for this visit  Objective:    /70   Pulse 72   Temp (!) 97 4 °F (36 3 °C)   Resp 16   Ht 5' 9" (1 753 m)   Wt 64 4 kg (142 lb)   BMI 20 97 kg/m²      Physical Exam   Constitutional: She appears well-developed and well-nourished  HENT:   Head: Normocephalic and atraumatic  Right Ear: External ear normal    Left Ear: External ear normal    Mouth/Throat: Oropharynx is clear and moist    Cardiovascular: Normal rate, regular rhythm and normal heart sounds  Exam reveals no friction rub  No murmur heard  Pulmonary/Chest: Effort normal and breath sounds normal  No respiratory distress  She has no wheezes  She has no rales  Musculoskeletal: She exhibits no edema or deformity  Nursing note and vitals reviewed            Evangelista DO Fernando

## 2020-03-12 NOTE — TELEPHONE ENCOUNTER
Spoke to patient, she said her son has strep  she did go to [facility] 2 weeks ago  She does not know of any exposure to anyone officially diagnosed with COVID-19  Advised patient to stay hydrated and take tylenol for the body aches/fever   Donnie Hubbard MA

## 2020-03-12 NOTE — TELEPHONE ENCOUNTER
Called patient , she said she has a mild cough and a low fever, of 99 5  Says it all began when she left our office  Stuffy nose, no SOB, no blurred vision, chest pain and no dizziness   forwarding to Dr Harjinder Krishna since Dr Gloria Delgado is out for the rest of the day Kacey Khan

## 2020-03-12 NOTE — TELEPHONE ENCOUNTER
NURSE    Patient was seen today for anxiety  She is now coughing and has a low grade fever  Please advise

## 2020-03-17 ENCOUNTER — OFFICE VISIT (OUTPATIENT)
Dept: FAMILY MEDICINE CLINIC | Facility: CLINIC | Age: 35
End: 2020-03-17
Payer: COMMERCIAL

## 2020-03-17 VITALS
HEART RATE: 72 BPM | HEIGHT: 69 IN | WEIGHT: 140 LBS | TEMPERATURE: 99.6 F | SYSTOLIC BLOOD PRESSURE: 122 MMHG | BODY MASS INDEX: 20.73 KG/M2 | RESPIRATION RATE: 16 BRPM | DIASTOLIC BLOOD PRESSURE: 84 MMHG

## 2020-03-17 DIAGNOSIS — R05.9 COUGH: Primary | ICD-10-CM

## 2020-03-17 PROCEDURE — 3008F BODY MASS INDEX DOCD: CPT | Performed by: FAMILY MEDICINE

## 2020-03-17 PROCEDURE — 99213 OFFICE O/P EST LOW 20 MIN: CPT | Performed by: FAMILY MEDICINE

## 2020-03-17 PROCEDURE — 1036F TOBACCO NON-USER: CPT | Performed by: FAMILY MEDICINE

## 2020-03-17 RX ORDER — AZITHROMYCIN 250 MG/1
TABLET, FILM COATED ORAL
Qty: 6 TABLET | Refills: 0 | Status: SHIPPED | OUTPATIENT
Start: 2020-03-17 | End: 2020-03-22

## 2020-03-17 RX ORDER — DEXTROMETHORPHAN HYDROBROMIDE AND PROMETHAZINE HYDROCHLORIDE 15; 6.25 MG/5ML; MG/5ML
5 SOLUTION ORAL 4 TIMES DAILY PRN
Qty: 240 ML | Refills: 0 | Status: SHIPPED | OUTPATIENT
Start: 2020-03-17 | End: 2020-04-22 | Stop reason: ALTCHOICE

## 2020-03-17 NOTE — TELEPHONE ENCOUNTER
Called patient to follow up  She said shes been Coughing for the past couple of days, last night coughing was worse  Mild SOB, temp was 99 9, no chest pain or blurred vision  No sore throat  What should the patient do?  Forwarding to Dr Ugo Matthews MA

## 2020-03-17 NOTE — PROGRESS NOTES
Assessment/Plan:       Diagnoses and all orders for this visit:    Cough  -     Promethazine-DM (PHENERGAN-DM) 6 25-15 mg/5 mL oral syrup; Take 5 mL by mouth 4 (four) times a day as needed for cough  -     azithromycin (Zithromax) 250 mg tablet; Take 2 tablets (500 mg total) by mouth daily for 1 day, THEN 1 tablet (250 mg total) daily for 4 days   - Reviewed supportive care with patient including rest, staying hydrated (drink 8-10 glasses of liquids such as water, ginger ale, juice),frequent handwashing to prevent spread of germs, and use of over the counter decongestants, cough suppressants, Ibuprofen/Acetaminophen  - Pt told to monitor temperatures  If she does develop fevers or shortness of breath that worsens, told to call for possible COVID-19 testing  Subjective:      Patient ID: Andrew Ontiveros is a 36 y o  female  HPI  Rajendra Balderas presents today with a 6 day hx of dry cough and mild SOB  Symptoms started gradually and have progressively been worsening  Pt states her temperatures have been no higher than 99 6  Denies chills, headaches, dizziness, congestion, sinus pain/pressure, sore throat, ear pain, N/V    Pt's children were recently sick with sinus infection and strep pharyngitis  She has had no exposure to sick contacts/COVID-19 and no recent travel  The following portions of the patient's history were reviewed and updated as appropriate: allergies, current medications, past family history, past medical history, past social history, past surgical history and problem list     Review of Systems   Constitutional: Negative for activity change, appetite change, chills, diaphoresis, fatigue and fever  HENT: Negative  Respiratory: Positive for cough  Negative for choking, chest tightness, shortness of breath and wheezing  Cardiovascular: Negative for chest pain, palpitations and leg swelling     Gastrointestinal: Negative for abdominal distention, abdominal pain, constipation, diarrhea, nausea and vomiting  Genitourinary: Negative for difficulty urinating, dyspareunia, dysuria, enuresis, flank pain, frequency, menstrual problem, pelvic pain and urgency  Musculoskeletal: Negative for arthralgias, back pain, gait problem, joint swelling, myalgias, neck pain and neck stiffness  Skin: Negative  Neurological: Negative for dizziness, tremors, syncope, facial asymmetry, weakness, light-headedness, numbness and headaches  Psychiatric/Behavioral: Negative  Objective:      /84   Pulse 72   Temp 99 6 °F (37 6 °C)   Resp 16   Ht 5' 9" (1 753 m)   Wt 63 5 kg (140 lb)   BMI 20 67 kg/m²          Physical Exam   Constitutional: She is oriented to person, place, and time  She appears well-developed and well-nourished  No distress  HENT:   Head: Normocephalic and atraumatic  Right Ear: External ear normal    Left Ear: External ear normal    Nose: Nose normal    Mouth/Throat: Oropharynx is clear and moist  No oropharyngeal exudate  Eyes: Right eye exhibits no discharge  Left eye exhibits no discharge  No scleral icterus  Neck: Normal range of motion  Neck supple  Cardiovascular: Normal rate, regular rhythm, normal heart sounds and intact distal pulses  Exam reveals no gallop and no friction rub  No murmur heard  Pulmonary/Chest: Effort normal and breath sounds normal  No respiratory distress  She has no wheezes  She has no rales  She exhibits no tenderness  Musculoskeletal: Normal range of motion  She exhibits no edema or deformity  Lymphadenopathy:     She has no cervical adenopathy  Neurological: She is alert and oriented to person, place, and time  Skin: Skin is warm and dry  She is not diaphoretic  Psychiatric: She has a normal mood and affect   Her behavior is normal  Judgment and thought content normal

## 2020-03-25 ENCOUNTER — TELEPHONE (OUTPATIENT)
Dept: RADIOLOGY | Facility: HOSPITAL | Age: 35
End: 2020-03-25

## 2020-03-25 NOTE — TELEPHONE ENCOUNTER
Attempted to leave a message to call Radiology to discuss canceling and rescheduling the appointment in light of Covid-19  However, phone is not in service

## 2020-04-22 ENCOUNTER — OFFICE VISIT (OUTPATIENT)
Dept: FAMILY MEDICINE CLINIC | Facility: CLINIC | Age: 35
End: 2020-04-22
Payer: COMMERCIAL

## 2020-04-22 DIAGNOSIS — F41.1 GENERALIZED ANXIETY DISORDER: ICD-10-CM

## 2020-04-22 DIAGNOSIS — F33.42 RECURRENT MAJOR DEPRESSIVE DISORDER, IN FULL REMISSION (HCC): Primary | ICD-10-CM

## 2020-04-22 PROCEDURE — 99213 OFFICE O/P EST LOW 20 MIN: CPT | Performed by: FAMILY MEDICINE

## 2020-07-26 NOTE — PROGRESS NOTES
Progress note - Cardiology Office   [facility]  Jess Jhaveri 36 y o  female MRN: [id number]  : 1985  Unit/Bed#:  Encounter: [id number]      Assessment:     1  Palpitation    2  Tachycardia    3  RBBB    4  Dyslipidemia    5  Generalized anxiety disorder    6  Hyperthyroidism        Discussion summary and Plan:    1  Palpitations  Most likely secondary to few PACs and PVCs  Patient's Holter monitor, echo, stress test result discussed with her  She has no structural heart disease  She does have mildly abnormal EKG and there was no evidence of any tachycardia noted  She was reassured at this time she does not need any medications  If her symptoms become worse and she has more frequent PVCs we will consider low-dose metoprolol  2  RBBB  Probably chronic  Echo Doppler shows no cardiomyopathy discussed with patient    3  History of anxiety disorder  On medication  Patient was reassured    4  Thyroid nodule with previous history of hypothyroidism  She is scheduled to have biopsy    5  Tachycardia   No episodes of significant tachycardia noted on the Holter monitor there were sinus tachycardia  Patient was reassured    6  Dyslipidemia  She has high cholesterol with good HDL  Continue dietary control    Patient's result including stress test echo and Holter monitor discussed with her  She has incomplete RBBB and some PACs  No other significant abnormal noted     She also smokes marijuana  She was advised to quit smoking marijuana and avoid stimulants  Lifestyle modification changes discussed with her at length  Thank you for your consultation  If you have any question please call me at [phone]    Counseling :  A description of the counseling  Goals and Barriers  Patient's ability to self care: Yes  Medication side effect reviewed with patient in detail and all their questions answered to their satisfaction        Primary Care Physician : Sho Omer DO      HPI :     Hui Miramontes is a 36y o  year old female who was referred by primary care doctor for palpitations  Patient has a past medical history significant for thyroid nodule, previous history of hypothyroidism now stable, anxiety and depression, anemia, history of uterine bleeding now  Was seen by primary care doctor for palpitations and found to have abnormal EKG  EKG shows she has incomplete RBBB with mildly prolonged QTC interval   She gets these palpitations almost every day and then she becomes very anxious and then she feels her heart is racing  She has no dizziness lightheadedness associated with palpitations but she if she bends over sometime and tried to get up quickly she felt dizzy and lightheaded  She does exercise almost 3-4 hours a week which is high intensity without any problems  She has no fever no chills no nausea no vomiting  She does have history of anxiety and was prescribed Xanax today heart rate is 85 beats per minute with incomplete RBBB with QRS duration almost close to  One hundred twenty milliseconds  There is no family history of premature coronary artery disease  She lives with her fiance  She has 2 kids  She believe she is not pregnant but she is also not using any contraceptive methods  She does not smoke does not drink occasionally eats chocolate  She used to smoke marijuana almost every day she has quit or decrease it since she has palpitations  No other drug abuse     07/29/2020  Above reviewed  Patient came for follow-up  She has a cardiac workup done she was initially seen for palpitations and tachycardia  Found to have RBBB  Echo shows normal LV systolic function stress test shows no ischemia at Holter monitor shows few PVCs which are 0 9% of the total beats  No nausea no vomiting no other significant complaint  She is trying to change her lifestyle  Advised to quit smoking marijuana      Review of Systems   Constitutional: Negative for activity change, chills, diaphoresis, fever and unexpected weight change  HENT: Negative for congestion  Eyes: Negative for discharge and redness  Respiratory: Negative for cough, chest tightness, shortness of breath and wheezing  Cardiovascular: Positive for palpitations  Negative for chest pain and leg swelling  Occasionally   Gastrointestinal: Negative for abdominal pain, diarrhea and nausea  Endocrine: Negative  Genitourinary: Negative for decreased urine volume and urgency  Musculoskeletal: Negative  Negative for arthralgias, back pain and gait problem  Skin: Negative for rash and wound  Allergic/Immunologic: Negative  Neurological: Negative for dizziness, seizures, syncope, weakness, light-headedness and headaches  Hematological: Negative  Psychiatric/Behavioral: Negative for agitation and confusion  The patient is not nervous/anxious          Historical Information   Past Medical History:   Diagnosis Date    Cervical cancer ([facility] [address] ) 5/25/2017    Migraine      Past Surgical History:   Procedure Laterality Date    ADENOIDECTOMY  9/2010    AUGMENTATION BREAST Bilateral 2011, 2012    BREAST IMPLANT Bilateral     TONSILLECTOMY      US GUIDED THYROID BIOPSY  11/22/2019     Social History     Substance and Sexual Activity   Alcohol Use Not Currently    Frequency: Monthly or less    Drinks per session: 1 or 2    Binge frequency: Never    Comment: socially     Social History     Substance and Sexual Activity   Drug Use Yes    Types: Marijuana    Comment: daily for sleep     Social History     Tobacco Use   Smoking Status Never Smoker   Smokeless Tobacco Never Used     Family History:   Family History   Problem Relation Age of Onset    Lupus Sister     Cancer Paternal Grandmother     Cancer Paternal Grandfather     No Known Problems Mother     Hypertension Father     Hypertension Daughter        Meds/Allergies     Allergies   Allergen Reactions    Other Other (See Comments)     Migraine medicine starts w/ a "B" causes dizziness    Sumatriptan      Other reaction(s): Paresthesia  Annotation - [id number]: neck pain       Current Outpatient Medications:     Multiple Vitamin (MULTIVITAMIN) capsule, Take 1 capsule by mouth daily, Disp: , Rfl:     norethindrone-ethinyl estradiol-iron (MICROGESTIN FE1 5/30) 1 5-30 MG-MCG tablet, Take 1 tablet by mouth daily, Disp: , Rfl:     Probiotic Product (PROBIOTIC-10 PO), Take by mouth, Disp: , Rfl:     sertraline (ZOLOFT) 100 mg tablet, Take 1 tablet (100 mg total) by mouth daily, Disp: 90 tablet, Rfl: 1    Vitals: Blood pressure 110/80, pulse 72, temperature 99 7 °F (37 6 °C), temperature source Temporal, height 5' 9" (1 753 m), weight 69 4 kg (153 lb), SpO2 98 %  Body mass index is 22 59 kg/m²  Vitals:    07/29/20 1149   Weight: 69 4 kg (153 lb)     BP Readings from Last 3 Encounters:   07/29/20 110/80   03/17/20 122/84   03/12/20 116/70         Physical Exam   Constitutional: She is oriented to person, place, and time  She appears well-developed and well-nourished  No distress  HENT:   Head: Normocephalic and atraumatic  Eyes: Pupils are equal, round, and reactive to light  Neck: Neck supple  No JVD present  No tracheal deviation present  No thyromegaly present  Cardiovascular: Normal rate, regular rhythm, S1 normal, S2 normal and intact distal pulses  Exam reveals no gallop, no S3, no S4, no distant heart sounds and no friction rub  Murmur heard  Systolic (ejection) murmur is present with a grade of 2/6  Pulmonary/Chest: Effort normal and breath sounds normal  No respiratory distress  She has no wheezes  She has no rales  She exhibits no tenderness  Abdominal: Soft  Bowel sounds are normal  She exhibits no distension  There is no tenderness  Musculoskeletal: She exhibits no edema or deformity  Neurological: She is alert and oriented to person, place, and time  Skin: Skin is warm and dry  No rash noted   She is not diaphoretic  No pallor  Psychiatric: She has a normal mood and affect  Her behavior is normal  Judgment normal        Diagnostic Studies Review Cardio:    1  Echo Doppler  Echo Doppler done in March 2020 shows normal LV systolic function no significant valvular disease  2  Exercise stress test   She exercised for 8 minutes it was normal stress test good exercise capacity    3  Holter monitor shows few PACs and PVCs there were less than 1% of the total beats  EKG:    Twelve lead EKG done in our office shows normal sinus rhythm with sinus arrhythmias  Heart rate 85 beats per minute  QTC is acceptable  Incomplete RBBB/ RBBB with QRS duration almost close to 120 milliseconds  Lab Review   Lab Results   Component Value Date    WBC 4 95 11/21/2019    HGB 12 4 11/21/2019    HCT 40 3 11/21/2019    MCV 90 11/21/2019    RDW 13 2 11/21/2019     11/21/2019     BMP:  Lab Results   Component Value Date    SODIUM 143 11/21/2019    K 3 6 11/21/2019     11/21/2019    CO2 29 11/21/2019    ANIONGAP 4 01/11/2015    BUN 15 11/21/2019    CREATININE 0 74 11/21/2019    GLUC 101 11/21/2019    CALCIUM 9 0 11/21/2019    EGFR 123 11/21/2019     LFT:  Lab Results   Component Value Date    AST 14 11/21/2019    ALT 20 11/21/2019    ALKPHOS 47 11/21/2019    TP 7 4 11/21/2019    ALB 3 9 11/21/2019      Lab Results   Component Value Date    TTN5GWWOYWGC 3 728 11/17/2019     Lipid Profile:   Lab Results   Component Value Date    CHOLESTEROL 264 (H) 10/24/2019    HDL 89 10/24/2019    LDLCALC 159 (H) 10/24/2019    TRIG 66 10/24/2019     Lab Results   Component Value Date    CHOLESTEROL 264 (H) 10/24/2019    CHOLESTEROL 248 (H) 09/14/2018     Lab Results   Component Value Date    TROPONINI <0 02 11/21/2019           Dr Dangelo Fernández MD [facility]      "This note has been constructed using a voice recognition system  Therefore there may be syntax, spelling, and/or grammatical errors  Please call if you have any questions  "

## 2020-07-29 ENCOUNTER — OFFICE VISIT (OUTPATIENT)
Dept: CARDIOLOGY CLINIC | Facility: CLINIC | Age: 35
End: 2020-07-29
Payer: COMMERCIAL

## 2020-07-29 VITALS
HEART RATE: 72 BPM | TEMPERATURE: 99.7 F | HEIGHT: 69 IN | SYSTOLIC BLOOD PRESSURE: 110 MMHG | BODY MASS INDEX: 22.66 KG/M2 | WEIGHT: 153 LBS | DIASTOLIC BLOOD PRESSURE: 80 MMHG | OXYGEN SATURATION: 98 %

## 2020-07-29 DIAGNOSIS — I45.10 RBBB: ICD-10-CM

## 2020-07-29 DIAGNOSIS — F41.1 GENERALIZED ANXIETY DISORDER: ICD-10-CM

## 2020-07-29 DIAGNOSIS — E78.5 DYSLIPIDEMIA: ICD-10-CM

## 2020-07-29 DIAGNOSIS — E05.90 HYPERTHYROIDISM: ICD-10-CM

## 2020-07-29 DIAGNOSIS — R00.2 PALPITATION: ICD-10-CM

## 2020-07-29 DIAGNOSIS — R00.0 TACHYCARDIA: ICD-10-CM

## 2020-07-29 PROCEDURE — 3008F BODY MASS INDEX DOCD: CPT | Performed by: INTERNAL MEDICINE

## 2020-07-29 PROCEDURE — 1036F TOBACCO NON-USER: CPT | Performed by: INTERNAL MEDICINE

## 2020-07-29 PROCEDURE — 99214 OFFICE O/P EST MOD 30 MIN: CPT | Performed by: INTERNAL MEDICINE

## 2020-07-29 RX ORDER — NORETHINDRONE ACETATE AND ETHINYL ESTRADIOL 1.5-30(21)
1 KIT ORAL DAILY
COMMUNITY
Start: 2020-07-28 | End: 2021-05-14

## 2020-09-19 DIAGNOSIS — F41.1 GENERALIZED ANXIETY DISORDER: ICD-10-CM

## 2020-09-19 RX ORDER — SERTRALINE HYDROCHLORIDE 100 MG/1
TABLET, FILM COATED ORAL
Qty: 90 TABLET | Refills: 1 | Status: SHIPPED | OUTPATIENT
Start: 2020-09-19 | End: 2021-03-25

## 2020-10-22 ENCOUNTER — OFFICE VISIT (OUTPATIENT)
Dept: FAMILY MEDICINE CLINIC | Facility: CLINIC | Age: 35
End: 2020-10-22
Payer: COMMERCIAL

## 2020-10-22 VITALS
SYSTOLIC BLOOD PRESSURE: 110 MMHG | HEIGHT: 69 IN | DIASTOLIC BLOOD PRESSURE: 50 MMHG | HEART RATE: 83 BPM | RESPIRATION RATE: 18 BRPM | OXYGEN SATURATION: 98 % | BODY MASS INDEX: 23.25 KG/M2 | WEIGHT: 157 LBS | TEMPERATURE: 97.2 F

## 2020-10-22 DIAGNOSIS — Z13.6 SCREENING FOR CARDIOVASCULAR CONDITION: ICD-10-CM

## 2020-10-22 DIAGNOSIS — D34 HURTHLE CELL NEOPLASM OF THYROID: Primary | ICD-10-CM

## 2020-10-22 DIAGNOSIS — F33.42 RECURRENT MAJOR DEPRESSIVE DISORDER, IN FULL REMISSION (HCC): ICD-10-CM

## 2020-10-22 DIAGNOSIS — Z13.0 SCREENING FOR DEFICIENCY ANEMIA: ICD-10-CM

## 2020-10-22 DIAGNOSIS — F41.1 GENERALIZED ANXIETY DISORDER: ICD-10-CM

## 2020-10-22 PROCEDURE — 99214 OFFICE O/P EST MOD 30 MIN: CPT | Performed by: FAMILY MEDICINE

## 2021-01-14 LAB
ALBUMIN SERPL-MCNC: 4.1 G/DL (ref 3.6–5.1)
ALBUMIN/GLOB SERPL: 1.3 (CALC) (ref 1–2.5)
ALP SERPL-CCNC: 30 U/L (ref 31–125)
ALT SERPL-CCNC: 12 U/L (ref 6–29)
AST SERPL-CCNC: 19 U/L (ref 10–30)
BILIRUB SERPL-MCNC: 0.5 MG/DL (ref 0.2–1.2)
BUN SERPL-MCNC: 11 MG/DL (ref 7–25)
BUN/CREAT SERPL: ABNORMAL (CALC) (ref 6–22)
CALCIUM SERPL-MCNC: 9.3 MG/DL (ref 8.6–10.2)
CHLORIDE SERPL-SCNC: 104 MMOL/L (ref 98–110)
CHOLEST SERPL-MCNC: 272 MG/DL
CHOLEST/HDLC SERPL: 3.9 (CALC)
CO2 SERPL-SCNC: 26 MMOL/L (ref 20–32)
CREAT SERPL-MCNC: 0.79 MG/DL (ref 0.5–1.1)
ERYTHROCYTE [DISTWIDTH] IN BLOOD BY AUTOMATED COUNT: 13.5 % (ref 11–15)
GLOBULIN SER CALC-MCNC: 3.1 G/DL (CALC) (ref 1.9–3.7)
GLUCOSE SERPL-MCNC: 68 MG/DL (ref 65–99)
HCT VFR BLD AUTO: 39.3 % (ref 35–45)
HDLC SERPL-MCNC: 70 MG/DL
HGB BLD-MCNC: 12.5 G/DL (ref 11.7–15.5)
LDLC SERPL CALC-MCNC: 181 MG/DL (CALC)
MCH RBC QN AUTO: 26.9 PG (ref 27–33)
MCHC RBC AUTO-ENTMCNC: 31.8 G/DL (ref 32–36)
MCV RBC AUTO: 84.7 FL (ref 80–100)
NONHDLC SERPL-MCNC: 202 MG/DL (CALC)
PLATELET # BLD AUTO: 241 THOUSAND/UL (ref 140–400)
PMV BLD REES-ECKER: 12.7 FL (ref 7.5–12.5)
POTASSIUM SERPL-SCNC: 4.3 MMOL/L (ref 3.5–5.3)
PROT SERPL-MCNC: 7.2 G/DL (ref 6.1–8.1)
RBC # BLD AUTO: 4.64 MILLION/UL (ref 3.8–5.1)
SL AMB EGFR AFRICAN AMERICAN: 113 ML/MIN/1.73M2
SL AMB EGFR NON AFRICAN AMERICAN: 98 ML/MIN/1.73M2
SODIUM SERPL-SCNC: 140 MMOL/L (ref 135–146)
T3 SERPL-MCNC: 161 NG/DL (ref 76–181)
T4 FREE SERPL-MCNC: 1.2 NG/DL (ref 0.8–1.8)
TRIGL SERPL-MCNC: 91 MG/DL
TSH SERPL-ACNC: 1.2 MIU/L
WBC # BLD AUTO: 6 THOUSAND/UL (ref 3.8–10.8)

## 2021-02-11 ENCOUNTER — CONSULT (OUTPATIENT)
Dept: ENDOCRINOLOGY | Facility: CLINIC | Age: 36
End: 2021-02-11
Payer: COMMERCIAL

## 2021-02-11 VITALS
WEIGHT: 175.6 LBS | TEMPERATURE: 97.3 F | BODY MASS INDEX: 25.93 KG/M2 | HEART RATE: 100 BPM | SYSTOLIC BLOOD PRESSURE: 140 MMHG | DIASTOLIC BLOOD PRESSURE: 82 MMHG

## 2021-02-11 DIAGNOSIS — R00.2 PALPITATION: ICD-10-CM

## 2021-02-11 DIAGNOSIS — E04.2 MULTINODULAR GOITER: Primary | ICD-10-CM

## 2021-02-11 DIAGNOSIS — D34 HURTHLE CELL NEOPLASM OF THYROID: ICD-10-CM

## 2021-02-11 PROCEDURE — 99244 OFF/OP CNSLTJ NEW/EST MOD 40: CPT | Performed by: INTERNAL MEDICINE

## 2021-02-11 NOTE — PROGRESS NOTES
Aspen Almodovar 30 y o  female MRN: [id number]    Encounter: [id number]      Assessment/Plan     Assessment: This is a 30y o -year-old female with Multi-nodular goiter,   Plan:    Diagnoses and all orders for this visit:    Multinodular goiter   as last ultrasound was done in 2019, will order follow-up thyroid ultrasound to assess all thyroid nodules  -     US thyroid; Future  -     US guided thyroid biopsy with AFIRMA; Future    Hurthle cell neoplasm of thyroid   previously cytopathology was suggestive of follicular neoplasm with Hurthle cell features  Will obtain biopsy of this nodule with Afirma technique  discussed the procedure for biopsy  discussed the possible outcomes of fine-needle aspiration biopsy including benign results, indeterminate result or malignant results and rarely nondiagnostic or insufficient results  I have briefly discussed the utility of gene expression  testing in the event of indeterminate results  also discussed with patient that 90% of thyroid nodules are usually benign, there is a 10-40% chance of thyroid cancer,  When cytopathology is positive for follicular neoplasm, and it is important to repeat fine-needle aspiration  patient is agreeable to have repeat biopsy    -     Ambulatory referral to Endocrinology  -     US thyroid; Future  -     US guided thyroid biopsy with AFIRMA; Future     palpitations   patient is currently followed by Cardiology, she is clinically and biochemically euthyroid, recent thyroid function test Is normal     CC:    multinodular goiter, abnormal cytopathology on biopsy results    History of Present Illness     HPI:    Aspen Almodovar  Is 61-year-old woman with medical history of generalized anxiety disorder, multinodular goiter, abnormal biopsy result on dominant thyroid nodule is here for evaluation of multinodular goiter as well as abnormal cytopathology       patient had a thyroid ultrasound in 2019 which showed right upper pole nodule 1 5 x 1 1 x 1 3 cm, mildly suspicious, right upper pole nodule 1 7 x 1 1 x 1 6 cm not suspicious, right mid gland nodule measuring 2 3 x 1 6 x 1 8 cm, moderately suspicious  she underwent fine-needle aspiration of 2 3 x 1 6 x 1 8 cm nodule  biopsy result was as following  Cyto-pathology- Suspicious for follicular neoplasm, 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)  No H/o radiation to the neck/ face / chest   No family Hx of thyroid cancer    she denies any obstructive symptoms    Results for Angeles Causey (MRN [id number]) as of 2/11/2021 14:12   Ref  Range 1/13/2021 10:11   TSH, POC Latest Units: mIU/L 1 20   Free T4 Latest Ref Range: 0 8 - 1 8 ng/dL 1 2   T3, Total Latest Ref Range: 76 - 181 ng/dL 161         Review of Systems   Constitutional: Negative for activity change, diaphoresis, fatigue, fever and unexpected weight change  HENT: Negative  Negative for trouble swallowing  Eyes: Negative for visual disturbance  Respiratory: Negative for cough, chest tightness and shortness of breath  Cardiovascular: Negative for chest pain, palpitations and leg swelling  Gastrointestinal: Negative for abdominal pain, constipation, diarrhea, nausea and vomiting  Endocrine: Negative for cold intolerance, heat intolerance, polydipsia, polyphagia and polyuria  Genitourinary: Negative for dysuria, enuresis, frequency and urgency  Musculoskeletal: Negative for arthralgias and myalgias  Skin: Negative for pallor, rash and wound  Allergic/Immunologic: Negative  Neurological: Negative for dizziness, tremors, weakness and numbness  Hematological: Negative  Psychiatric/Behavioral: The patient is nervous/anxious          Historical Information   Past Medical History:   Diagnosis Date    Cervical cancer ([facility] [address] ) 5/25/2017    Migraine      Past Surgical History:   Procedure Laterality Date    ADENOIDECTOMY  9/2010    AUGMENTATION BREAST Bilateral 2011, 2012    BREAST IMPLANT Bilateral     TONSILLECTOMY      US GUIDED THYROID BIOPSY  11/22/2019     Social History   Social History     Substance and Sexual Activity   Alcohol Use Yes    Frequency: 2-4 times a month    Drinks per session: 1 or 2    Binge frequency: Never    Comment: socially     Social History     Substance and Sexual Activity   Drug Use Yes    Types: Marijuana    Comment: daily for sleep     Social History     Tobacco Use   Smoking Status Never Smoker   Smokeless Tobacco Never Used     Family History:   Family History   Problem Relation Age of Onset    Lupus Sister     Cancer Paternal Grandmother     Cancer Paternal Grandfather     No Known Problems Mother     Hypertension Father     Hypertension Daughter        Meds/Allergies   Current Outpatient Medications   Medication Sig Dispense Refill    Multiple Vitamin (MULTIVITAMIN) capsule Take 1 capsule by mouth daily      norethindrone-ethinyl estradiol-iron (MICROGESTIN FE1 5/30) 1 5-30 MG-MCG tablet Take 1 tablet by mouth daily      Probiotic Product (PROBIOTIC-10 PO) Take by mouth      sertraline (ZOLOFT) 100 mg tablet TAKE 1 TABLET(100 MG) BY MOUTH DAILY 90 tablet 1     No current facility-administered medications for this visit  Allergies   Allergen Reactions    Other Other (See Comments)     Migraine medicine starts w/ a "B" causes dizziness    Sumatriptan      Other reaction(s): Paresthesia  [facility] - [id number]: neck pain       Objective   Vitals: Blood pressure 140/82, pulse 100, temperature (!) 97 3 °F (36 3 °C), weight 79 7 kg (175 lb 9 6 oz)  Physical Exam  Vitals signs reviewed  Constitutional:       General: She is not in acute distress  Appearance: She is well-developed  She is not diaphoretic  HENT:      Head: Normocephalic and atraumatic  Eyes:      General:         Right eye: No discharge  Left eye: No discharge        Conjunctiva/sclera: Conjunctivae normal       Pupils: Pupils are equal, round, and reactive to light    Neck:      Musculoskeletal: Normal range of motion and neck supple  Thyroid: No thyromegaly  Trachea: No tracheal deviation  Comments: Palpable thyroid nodule on right side  Cardiovascular:      Rate and Rhythm: Normal rate and regular rhythm  Heart sounds: Normal heart sounds  No murmur  No friction rub  Pulmonary:      Effort: Pulmonary effort is normal  No respiratory distress  Breath sounds: Normal breath sounds  Abdominal:      General: Bowel sounds are normal    Musculoskeletal: Normal range of motion  General: No tenderness or deformity  Lymphadenopathy:      Cervical: No cervical adenopathy  Skin:     General: Skin is warm and dry  Coloration: Skin is not pale  Findings: No erythema or rash  Neurological:      General: No focal deficit present  Mental Status: She is alert and oriented to person, place, and time  Motor: No abnormal muscle tone  Coordination: Coordination normal       Deep Tendon Reflexes: Reflexes are normal and symmetric  Reflexes normal    Psychiatric:         Behavior: Behavior normal          The history was obtained from the review of the chart, patient  Lab Results:   Lab Results   Component Value Date/Time    Free t4 1 2 01/13/2021 10:11 AM       Imaging Studies:   Results for orders placed during the hospital encounter of 10/24/19   US thyroid    Impression Right-sided thyroid nodules  The following meet current ACR criteria for recommending ultrasound guided biopsy:     RIGHT midgland nodule (#3) measuring 2 3 x 1 6 x 1 8 cm  TR 4 (4-6 points), Moderately suspicious  Follow-up ultrasound in 1 year recommended for additional right upper pole nodule(s)  Reference: ACR Thyroid Imaging, Reporting and Data System (TI-RADS): White Paper of the [employer]   J AM Heather Radiol [id number]  (additional recommendations based on American Thyroid Association 2015 guidelines )    The study was marked in EPIC for significant notification  Workstation performed: [id number]         I have personally reviewed pertinent reports  Portions of the record may have been created with voice recognition software  Occasional wrong word or "sound a like" substitutions may have occurred due to the inherent limitations of voice recognition software  Read the chart carefully and recognize, using context, where substitutions have occurred

## 2021-03-25 DIAGNOSIS — F41.1 GENERALIZED ANXIETY DISORDER: ICD-10-CM

## 2021-03-25 RX ORDER — SERTRALINE HYDROCHLORIDE 100 MG/1
TABLET, FILM COATED ORAL
Qty: 90 TABLET | Refills: 1 | Status: SHIPPED | OUTPATIENT
Start: 2021-03-25 | End: 2021-05-14 | Stop reason: SDUPTHER

## 2021-05-14 ENCOUNTER — OFFICE VISIT (OUTPATIENT)
Dept: FAMILY MEDICINE CLINIC | Facility: CLINIC | Age: 36
End: 2021-05-14
Payer: COMMERCIAL

## 2021-05-14 VITALS
RESPIRATION RATE: 16 BRPM | DIASTOLIC BLOOD PRESSURE: 70 MMHG | WEIGHT: 172 LBS | TEMPERATURE: 98 F | BODY MASS INDEX: 25.48 KG/M2 | SYSTOLIC BLOOD PRESSURE: 118 MMHG | HEART RATE: 60 BPM | HEIGHT: 69 IN

## 2021-05-14 DIAGNOSIS — Z00.00 ANNUAL PHYSICAL EXAM: Primary | ICD-10-CM

## 2021-05-14 DIAGNOSIS — Z13.0 SCREENING FOR DEFICIENCY ANEMIA: ICD-10-CM

## 2021-05-14 DIAGNOSIS — Z13.6 SCREENING FOR CARDIOVASCULAR CONDITION: ICD-10-CM

## 2021-05-14 DIAGNOSIS — E78.2 MIXED HYPERLIPIDEMIA: ICD-10-CM

## 2021-05-14 DIAGNOSIS — E05.90 HYPERTHYROIDISM: ICD-10-CM

## 2021-05-14 DIAGNOSIS — F33.42 RECURRENT MAJOR DEPRESSIVE DISORDER, IN FULL REMISSION (HCC): ICD-10-CM

## 2021-05-14 DIAGNOSIS — F41.1 GENERALIZED ANXIETY DISORDER: ICD-10-CM

## 2021-05-14 PROCEDURE — 99395 PREV VISIT EST AGE 18-39: CPT | Performed by: FAMILY MEDICINE

## 2021-05-14 NOTE — PROGRESS NOTES
FAMILY PRACTICE HEALTH MAINTENANCE OFFICE VISIT  [facility] - [facility]    NAME: Pratik Camacho  AGE: 30 y o  SEX: female  : 1985     DATE: 2021    Assessment and Plan     1  Annual physical exam    2  Recurrent major depressive disorder, in full remission ([facility] [address] )  Assessment & Plan:  Doing well on sertraline  Continue 50 mg       3  Generalized anxiety disorder  -     sertraline (ZOLOFT) 50 mg tablet; Take 1 tablet (50 mg total) by mouth daily    4  Mixed hyperlipidemia  -     Lipid Panel with Direct LDL reflex; Future  -     Lipid Panel with Direct LDL reflex    5  Hyperthyroidism  -     T4, free; Future  -     TSH, 3rd generation; Future  -     T4, free  -     TSH, 3rd generation    6  Screening for cardiovascular condition  -     Comprehensive metabolic panel; Future  -     Comprehensive metabolic panel    7  Screening for deficiency anemia  -     CBC; Future  -     CBC      · Patient Counseling:   · Nutrition: Stressed importance of a well balanced diet, moderation of sodium/saturated fat, caloric balance and sufficient intake of fiber  · Exercise: Stressed the importance of regular exercise with a goal of 150 minutes per week  · Dental Health: Discussed daily flossing and brushing and regular dental visits     · Immunizations reviewed: Up To Date  · Discussed benefits of:  Cervical Cancer screening and Screening labs   BMI Counseling: Body mass index is 25 4 kg/m²  Discussed with patient's BMI with her  The BMI is above normal  Exercise recommendations include exercising 3-5 times per week  Return in about 6 months (around 2021) for Next scheduled follow up  She is going to schedule her COVID vaccine  Chief Complaint     Chief Complaint   Patient presents with    Well Check     Sees [facility] for [place] LPN       History of Present Illness     Patient is feeling well    She has gained some weight during the pandemic but is exercising very regularly  Well Adult Physical   Patient here for a comprehensive physical exam       Diet and Physical Activity  Diet: well balanced diet  Exercise: frequently      Depression Screen  PHQ-9 Depression Screening    PHQ-9:   Frequency of the following problems over the past two weeks:      Little interest or pleasure in doing things: 0 - not at all  Feeling down, depressed, or hopeless: 0 - not at all  Trouble falling or staying asleep, or sleeping too much: 0 - not at all  Feeling tired or having little energy: 0 - not at all  Poor appetite or overeatin - not at all  Feeling bad about yourself - or that you are a failure or have let yourself or your family down: 0 - not at all  Trouble concentrating on things, such as reading the newspaper or watching television: 0 - not at all  Moving or speaking so slowly that other people could have noticed  Or the opposite - being so fidgety or restless that you have been moving around a lot more than usual: 0 - not at all  Thoughts that you would be better off dead, or of hurting yourself in some way: 0 - not at all  PHQ-2 Score: 0  PHQ-9 Score: 0          General Health  Hearing: Normal:  bilateral  Vision: no vision problems  Dental: regular dental visits    Reproductive Health  Follows with gynecologist      The following portions of the patient's history were reviewed and updated as appropriate: allergies, current medications, past family history, past medical history, past social history, past surgical history and problem list     Review of Systems     Review of Systems   Constitutional: Negative  Respiratory: Negative  Cardiovascular: Negative          Past Medical History     Past Medical History:   Diagnosis Date    Cervical cancer ([address] ) 2017    Migraine        Past Surgical History     Past Surgical History:   Procedure Laterality Date    ADENOIDECTOMY  2010    AUGMENTATION BREAST Bilateral ,     BREAST IMPLANT Bilateral     TONSILLECTOMY      US GUIDED THYROID BIOPSY  2019       Social History     Social History     Socioeconomic History    Marital status:      Spouse name: None    Number of children: None    Years of education: 2 year college    Highest education level: None   Occupational History    Occupation: Stay home mom   Social Needs    Financial resource strain: None    Food insecurity     Worry: None     Inability: None    Transportation needs     Medical: None     Non-medical: None   Tobacco Use    Smoking status: Never Smoker    Smokeless tobacco: Never Used   Substance and Sexual Activity    Alcohol use: Yes     Frequency: 2-4 times a month     Drinks per session: 1 or 2     Binge frequency: Never     Comment: socially    Drug use: Yes     Types: Marijuana     Comment: daily for sleep    Sexual activity: Yes     Partners: Male     Birth control/protection: None   Lifestyle    Physical activity     Days per week: None     Minutes per session: None    Stress: None   Relationships    Social connections     Talks on phone: None     Gets together: None     Attends Sikhism service: None     Active member of club or organization: None     Attends meetings of clubs or organizations: None     Relationship status: None    Intimate partner violence     Fear of current or ex partner: None     Emotionally abused: None     Physically abused: None     Forced sexual activity: None   Other Topics Concern    None   Social History Narrative    Most recent tobacco use screenin2020    Do you currently or have you served in the [employer]: No    Were you activated, into active duty, as a member of the [email or web address] or as a Reservist: No    Occupation: Stay at home MOM    Education: [address]    Marital status:     Sexual orientation: Heterosexual    Exercise level:  Moderate    Diet: Vegetarian    General stress level: High    Alcohol intake: Occasional    Caffeine intake: None Chewing tobacco: none    Illicit drugs: marijuana    Seat belts used routinely: Yes    Sunscreen used routinely: Yes    Smoke alarm in home: Yes    Advance directive: No    Salt Intake: minimal    Has the Patient had a mammogram to screen for breast cancer within 24 months: No       Family History     Family History   Problem Relation Age of Onset    Lupus Sister     Cancer Paternal Grandmother     Cancer Paternal Grandfather     No Known Problems Mother     Hypertension Father     Hypertension Daughter        Current Medications       Current Outpatient Medications:     Multiple Vitamin (MULTIVITAMIN) capsule, Take 1 capsule by mouth daily, Disp: , Rfl:     Probiotic Product (PROBIOTIC-10 PO), Take by mouth, Disp: , Rfl:     sertraline (ZOLOFT) 50 mg tablet, Take 1 tablet (50 mg total) by mouth daily, Disp: 90 tablet, Rfl: 1     Allergies     Allergies   Allergen Reactions    Other Other (See Comments)     Migraine medicine starts w/ a "B" causes dizziness    Sumatriptan      Other reaction(s): Paresthesia  Annotation - [id number]: neck pain       Objective     /70   Pulse 60   Temp 98 °F (36 7 °C)   Resp 16   Ht 5' 9" (1 753 m)   Wt 78 kg (172 lb)   LMP 05/01/2021 (Exact Date)   BMI 25 40 kg/m²      Physical Exam  Vitals signs and nursing note reviewed  Constitutional:       Appearance: She is well-developed  HENT:      Head: Normocephalic and atraumatic  Right Ear: Tympanic membrane and external ear normal       Left Ear: Tympanic membrane and external ear normal    Eyes:      Pupils: Pupils are equal, round, and reactive to light  Neck:      Musculoskeletal: Normal range of motion  Cardiovascular:      Rate and Rhythm: Normal rate and regular rhythm  Heart sounds: Normal heart sounds  No murmur  No friction rub  Pulmonary:      Effort: Pulmonary effort is normal  No respiratory distress  Breath sounds: Normal breath sounds  No wheezing or rales     Abdominal: General: Bowel sounds are normal  There is no distension  Palpations: Abdomen is soft  There is no mass  Tenderness: There is no abdominal tenderness  There is no guarding or rebound  Musculoskeletal:      Right lower leg: No edema  Left lower leg: No edema  Skin:     General: Skin is warm  Neurological:      Mental Status: She is alert and oriented to person, place, and time              Visual Acuity Screening    Right eye Left eye Both eyes   Without correction:      With correction: 20/25 20/25 [address]

## 2021-05-17 ENCOUNTER — TELEPHONE (OUTPATIENT)
Dept: ADMINISTRATIVE | Facility: OTHER | Age: 36
End: 2021-05-17

## 2021-05-17 NOTE — TELEPHONE ENCOUNTER
----- Message from Marilynn Tariq DO sent at 5/14/2021  8:31 AM EDT -----  05/14/21 8:31 AM    Hello, our patient Reji Randolph has had Pap Smear (HPV) aka Cervical Cancer Screening completed/performed  Please assist in updating the patient chart by pulling the Care Everywhere (CE) document  The date of service is 7/28/20       Thank you,  Marilynn Tariq DO  [facility]

## 2021-05-20 NOTE — TELEPHONE ENCOUNTER
Upon review of the In Basket request we were able to locate, review, and update the patient chart as requested for Pap Smear (HPV) aka Cervical Cancer Screening  Any additional questions or concerns should be emailed to the Practice Liaisons via [email or web address] email, please do not reply via In Basket      Thank you  Ronald Castro

## 2021-11-19 ENCOUNTER — OFFICE VISIT (OUTPATIENT)
Dept: FAMILY MEDICINE CLINIC | Facility: CLINIC | Age: 36
End: 2021-11-19
Payer: COMMERCIAL

## 2021-11-19 VITALS
HEIGHT: 69 IN | DIASTOLIC BLOOD PRESSURE: 68 MMHG | WEIGHT: 165 LBS | TEMPERATURE: 97.8 F | BODY MASS INDEX: 24.44 KG/M2 | HEART RATE: 68 BPM | SYSTOLIC BLOOD PRESSURE: 110 MMHG | RESPIRATION RATE: 16 BRPM

## 2021-11-19 DIAGNOSIS — F41.1 GENERALIZED ANXIETY DISORDER: Primary | ICD-10-CM

## 2021-11-19 DIAGNOSIS — Z31.69 INFERTILITY COUNSELING: ICD-10-CM

## 2021-11-19 PROCEDURE — 99213 OFFICE O/P EST LOW 20 MIN: CPT | Performed by: FAMILY MEDICINE

## 2022-05-20 ENCOUNTER — OFFICE VISIT (OUTPATIENT)
Dept: FAMILY MEDICINE CLINIC | Facility: CLINIC | Age: 37
End: 2022-05-20
Payer: COMMERCIAL

## 2022-05-20 VITALS
RESPIRATION RATE: 16 BRPM | SYSTOLIC BLOOD PRESSURE: 122 MMHG | HEART RATE: 60 BPM | TEMPERATURE: 96.7 F | HEIGHT: 69 IN | DIASTOLIC BLOOD PRESSURE: 70 MMHG | WEIGHT: 159 LBS | BODY MASS INDEX: 23.55 KG/M2

## 2022-05-20 DIAGNOSIS — F41.1 GENERALIZED ANXIETY DISORDER: ICD-10-CM

## 2022-05-20 DIAGNOSIS — Z13.6 SCREENING FOR CARDIOVASCULAR CONDITION: ICD-10-CM

## 2022-05-20 DIAGNOSIS — Z13.0 SCREENING FOR DEFICIENCY ANEMIA: ICD-10-CM

## 2022-05-20 DIAGNOSIS — E05.90 HYPERTHYROIDISM: Primary | ICD-10-CM

## 2022-05-20 PROCEDURE — 99213 OFFICE O/P EST LOW 20 MIN: CPT | Performed by: FAMILY MEDICINE

## 2022-05-20 NOTE — PROGRESS NOTES
Assessment/Plan:    1  Hyperthyroidism  Assessment & Plan:  Due for labs    Orders:  -     TSH, 3rd generation; Future  -     T4, free; Future  -     T3; Future  -     TSH, 3rd generation  -     T4, free  -     T3    2  Generalized anxiety disorder  Assessment & Plan:  Improved with sertraline  Will continue 50 mg a day    Orders:  -     sertraline (ZOLOFT) 50 mg tablet; Take 1 tablet (50 mg total) by mouth in the morning  3  Screening for cardiovascular condition  -     Comprehensive metabolic panel; Future  -     Lipid Panel with Direct LDL reflex; Future  -     Comprehensive metabolic panel  -     Lipid Panel with Direct LDL reflex    4  Screening for deficiency anemia  -     CBC; Future  -     CBC           There are no Patient Instructions on file for this visit  Return in about 1 year (around 5/20/2023) for Annual physical     Subjective:      Patient ID: Enrique Shaikh is a 29 y o  female  Chief Complaint   Patient presents with    Follow-up     Ac/lpn       Her anxiety started acting up around her wedding  It then gradually got worse  She started 50 mg of sertraline and that has been controlling her symptoms  She is still staying active  The following portions of the patient's history were reviewed and updated as appropriate:  past social history    Review of Systems      Current Outpatient Medications   Medication Sig Dispense Refill    Prenatal Vit-Fe Fumarate-FA (PRENATAL VITAMINS PO)       sertraline (ZOLOFT) 50 mg tablet Take 1 tablet (50 mg total) by mouth in the morning  90 tablet 3     No current facility-administered medications for this visit  Objective:    /70   Pulse 60   Temp (!) 96 7 °F (35 9 °C)   Resp 16   Ht 5' 9" (1 753 m)   Wt 72 1 kg (159 lb)   BMI 23 48 kg/m²      Physical Exam  Vitals and nursing note reviewed  Constitutional:       Appearance: She is well-developed  HENT:      Head: Normocephalic and atraumatic        Right Ear: Tympanic membrane and external ear normal       Left Ear: Tympanic membrane and external ear normal    Cardiovascular:      Rate and Rhythm: Normal rate and regular rhythm  Heart sounds: Normal heart sounds  No murmur heard  No friction rub  Pulmonary:      Effort: Pulmonary effort is normal  No respiratory distress  Breath sounds: Normal breath sounds  No wheezing or rales  Musculoskeletal:      Right lower leg: No edema  Left lower leg: No edema               Sol Bender DO

## 2022-06-06 ENCOUNTER — OFFICE VISIT (OUTPATIENT)
Dept: FAMILY MEDICINE CLINIC | Facility: CLINIC | Age: 37
End: 2022-06-06
Payer: COMMERCIAL

## 2022-06-06 VITALS
HEIGHT: 69 IN | BODY MASS INDEX: 23.4 KG/M2 | RESPIRATION RATE: 16 BRPM | DIASTOLIC BLOOD PRESSURE: 86 MMHG | HEART RATE: 70 BPM | OXYGEN SATURATION: 96 % | SYSTOLIC BLOOD PRESSURE: 138 MMHG | WEIGHT: 158 LBS | TEMPERATURE: 98.4 F

## 2022-06-06 DIAGNOSIS — R82.90 ABNORMAL URINALYSIS: Primary | ICD-10-CM

## 2022-06-06 DIAGNOSIS — F41.1 GENERALIZED ANXIETY DISORDER: ICD-10-CM

## 2022-06-06 PROCEDURE — 99213 OFFICE O/P EST LOW 20 MIN: CPT | Performed by: NURSE PRACTITIONER

## 2022-06-06 RX ORDER — METHOCARBAMOL 750 MG/1
750 TABLET, FILM COATED ORAL 3 TIMES DAILY PRN
COMMUNITY
Start: 2022-06-03

## 2022-06-06 NOTE — PROGRESS NOTES
Assessment/Plan:    1  Abnormal urinalysis  -     Urinalysis with microscopic    2  Generalized anxiety disorder  Comments:  on zoloft    Discussed with patient that rapid urine test can cause some contamination which can be read as blood and needs to follow with UA with microscopic evaluation and based on that results, will do further work up        Patient Instructions:  Supportive care discussed and advised  Advised to RTO for any worsening and no improvement  Follow up for no improvement and worsening of conditions  Patient advised and educated when to see immediate medical care  Return if symptoms worsen or fail to improve  No future appointments  Subjective:      Patient ID: Darshana Parks is a 29 y o  female  Chief Complaint   Patient presents with    E/R Follow Up     [facility] Blood In Urine Mz cma    Back Pain         Vitals:  /86   Pulse 70   Temp 98 4 °F (36 9 °C)   Resp 16   Ht 5' 9" (1 753 m)   Wt 71 7 kg (158 lb)   SpO2 96%   BMI 23 33 kg/m²     HPI  Patient stated that went to ER on 6/3/2022 as puller her lower back while lifting weights and was informed that her urine test showed trace blood and needs to follow with PCP  Patient denies any  symptoms, fever, chills and irregular vaginal bleeding  The following portions of the patient's history were reviewed and updated as appropriate: allergies, current medications, past family history, past medical history, past social history, past surgical history and problem list       Review of Systems   Constitutional: Negative  Negative for chills, diaphoresis, fatigue, fever and unexpected weight change  Respiratory: Negative  Cardiovascular: Negative  Gastrointestinal: Negative for abdominal pain, nausea and vomiting     Genitourinary: Negative for decreased urine volume, difficulty urinating, dyspareunia, dysuria, enuresis, flank pain, frequency, genital sores, hematuria, menstrual problem, pelvic pain, urgency, vaginal bleeding, vaginal discharge and vaginal pain  Musculoskeletal: Positive for back pain  Skin: Negative  Neurological: Negative for dizziness and headaches  Objective:    Social History     Tobacco Use   Smoking Status Never Smoker   Smokeless Tobacco Never Used       Allergies: Allergies   Allergen Reactions    Other Other (See Comments)     Migraine medicine starts w/ a "B" causes dizziness    Sumatriptan      Other reaction(s): Paresthesia  Annotation - [id number]: neck pain         Current Outpatient Medications   Medication Sig Dispense Refill    methocarbamol (ROBAXIN) 750 mg tablet Take 750 mg by mouth Three times daily as needed      Prenatal Vit-Fe Fumarate-FA (PRENATAL VITAMINS PO)       sertraline (ZOLOFT) 50 mg tablet Take 1 tablet (50 mg total) by mouth in the morning  90 tablet 3     No current facility-administered medications for this visit  Physical Exam  Vitals reviewed  Constitutional:       Appearance: She is well-developed  Cardiovascular:      Rate and Rhythm: Normal rate and regular rhythm  Heart sounds: Normal heart sounds  Pulmonary:      Effort: Pulmonary effort is normal       Breath sounds: Normal breath sounds  Abdominal:      General: Bowel sounds are normal       Palpations: Abdomen is soft  Tenderness: There is no abdominal tenderness  Skin:     General: Skin is warm and dry  Neurological:      Mental Status: She is alert and oriented to person, place, and time  Psychiatric:         Behavior: Behavior normal          Thought Content:  Thought content normal          Judgment: Judgment normal                      HAMIDA Bach

## 2022-06-07 LAB
ALBUMIN SERPL-MCNC: 4.3 G/DL (ref 3.6–5.1)
ALBUMIN/GLOB SERPL: 1.4 (CALC) (ref 1–2.5)
ALP SERPL-CCNC: 34 U/L (ref 31–125)
ALT SERPL-CCNC: 8 U/L (ref 6–29)
APPEARANCE UR: CLEAR
AST SERPL-CCNC: 15 U/L (ref 10–30)
BACTERIA UR QL AUTO: ABNORMAL /HPF
BILIRUB SERPL-MCNC: 0.6 MG/DL (ref 0.2–1.2)
BILIRUB UR QL STRIP: NEGATIVE
BUN SERPL-MCNC: 10 MG/DL (ref 7–25)
BUN/CREAT SERPL: NORMAL (CALC) (ref 6–22)
CALCIUM SERPL-MCNC: 9.6 MG/DL (ref 8.6–10.2)
CHLORIDE SERPL-SCNC: 104 MMOL/L (ref 98–110)
CHOLEST SERPL-MCNC: 250 MG/DL
CHOLEST/HDLC SERPL: 2.9 (CALC)
CO2 SERPL-SCNC: 26 MMOL/L (ref 20–32)
COLOR UR: YELLOW
CREAT SERPL-MCNC: 0.75 MG/DL (ref 0.5–1.1)
ERYTHROCYTE [DISTWIDTH] IN BLOOD BY AUTOMATED COUNT: 13.1 % (ref 11–15)
GLOBULIN SER CALC-MCNC: 3 G/DL (CALC) (ref 1.9–3.7)
GLUCOSE SERPL-MCNC: 88 MG/DL (ref 65–99)
GLUCOSE UR QL STRIP: NEGATIVE
HCT VFR BLD AUTO: 36.6 % (ref 35–45)
HDLC SERPL-MCNC: 86 MG/DL
HGB BLD-MCNC: 11.8 G/DL (ref 11.7–15.5)
HGB UR QL STRIP: NEGATIVE
HYALINE CASTS #/AREA URNS LPF: ABNORMAL /LPF
KETONES UR QL STRIP: ABNORMAL
LDLC SERPL CALC-MCNC: 145 MG/DL (CALC)
LEUKOCYTE ESTERASE UR QL STRIP: NEGATIVE
MCH RBC QN AUTO: 27.6 PG (ref 27–33)
MCHC RBC AUTO-ENTMCNC: 32.2 G/DL (ref 32–36)
MCV RBC AUTO: 85.5 FL (ref 80–100)
NITRITE UR QL STRIP: NEGATIVE
NONHDLC SERPL-MCNC: 164 MG/DL (CALC)
PH UR STRIP: 6 [PH] (ref 5–8)
PLATELET # BLD AUTO: 185 THOUSAND/UL (ref 140–400)
PMV BLD REES-ECKER: 12.5 FL (ref 7.5–12.5)
POTASSIUM SERPL-SCNC: 3.8 MMOL/L (ref 3.5–5.3)
PROT SERPL-MCNC: 7.3 G/DL (ref 6.1–8.1)
PROT UR QL STRIP: NEGATIVE
RBC # BLD AUTO: 4.28 MILLION/UL (ref 3.8–5.1)
RBC #/AREA URNS HPF: ABNORMAL /HPF
SL AMB EGFR AFRICAN AMERICAN: 120 ML/MIN/1.73M2
SL AMB EGFR NON AFRICAN AMERICAN: 103 ML/MIN/1.73M2
SODIUM SERPL-SCNC: 138 MMOL/L (ref 135–146)
SP GR UR STRIP: 1.02 (ref 1–1.03)
SQUAMOUS #/AREA URNS HPF: ABNORMAL /HPF
T3 SERPL-MCNC: 113 NG/DL (ref 76–181)
T4 FREE SERPL-MCNC: 1.2 NG/DL (ref 0.8–1.8)
TRIGL SERPL-MCNC: 89 MG/DL
TSH SERPL-ACNC: 1.44 MIU/L
WBC # BLD AUTO: 3.7 THOUSAND/UL (ref 3.8–10.8)
WBC #/AREA URNS HPF: ABNORMAL /HPF

## 2023-05-09 ENCOUNTER — OFFICE VISIT (OUTPATIENT)
Dept: FAMILY MEDICINE CLINIC | Facility: CLINIC | Age: 38
End: 2023-05-09

## 2023-05-09 VITALS
WEIGHT: 149 LBS | DIASTOLIC BLOOD PRESSURE: 82 MMHG | HEART RATE: 81 BPM | RESPIRATION RATE: 18 BRPM | TEMPERATURE: 98.4 F | SYSTOLIC BLOOD PRESSURE: 122 MMHG | HEIGHT: 69 IN | BODY MASS INDEX: 22.07 KG/M2

## 2023-05-09 DIAGNOSIS — F41.1 GENERALIZED ANXIETY DISORDER: ICD-10-CM

## 2023-05-09 DIAGNOSIS — K58.2 IRRITABLE BOWEL SYNDROME WITH BOTH CONSTIPATION AND DIARRHEA: ICD-10-CM

## 2023-05-09 DIAGNOSIS — K60.2 ANAL FISSURE: Primary | ICD-10-CM

## 2023-05-09 RX ORDER — SERTRALINE HYDROCHLORIDE 100 MG/1
100 TABLET, FILM COATED ORAL DAILY
Qty: 90 TABLET | Refills: 1 | Status: SHIPPED | OUTPATIENT
Start: 2023-05-09

## 2023-05-09 NOTE — PROGRESS NOTES
"Name: Jakub Chamorro      : 1985      MRN: [id number]  Encounter Provider: Randolph Hodgkins, DO  Encounter Date: 2023   Encounter department: [address] [place] [address]     1  Anal fissure  Comments:  improving  to continue to follow up with colorectal surgeon     2  Generalized anxiety disorder  Assessment & Plan:  Not controlled  Dose of sertraline increased from 50 to 100 mg daily    Orders:  -     sertraline (ZOLOFT) 100 mg tablet; Take 1 tablet (100 mg total) by mouth daily    3  Irritable bowel syndrome with both constipation and diarrhea  Assessment & Plan:  Recommended that she increase fiber  2 kiwis daily without the skin recommended          Return in about 6 months (around 2023) for Annual physical       Subjective      She has an anal fissure that has been causing her pain  She has been seeing a colorectal surgeon for this  The fissure came after she was given hydroxyzine which caused constipation  She has a history of getting diarrhea and constipation from her irritable bowel  She has been feeling more anxious and would like a higher dose of the sertraline  She has been in chronic pain and has been making her on edge    Review of Systems    Current Outpatient Medications on File Prior to Visit   Medication Sig   • Skin Protectants, Misc  (VASELINE LIP THERAPY EX)    • [DISCONTINUED] sertraline (ZOLOFT) 50 mg tablet Take 1 tablet (50 mg total) by mouth in the morning  • [DISCONTINUED] methocarbamol (ROBAXIN) 750 mg tablet Take 750 mg by mouth Three times daily as needed (Patient not taking: Reported on 2023)   • [DISCONTINUED] Prenatal Vit-Fe Fumarate-FA (PRENATAL VITAMINS PO)        Objective     /82   Pulse 81   Temp 98 4 °F (36 9 °C)   Resp 18   Ht 5' 9\" (1 753 m)   Wt 67 6 kg (149 lb)   LMP 2023 (Exact Date)   BMI 22 00 kg/m²     Physical Exam  Vitals and nursing note reviewed     Constitutional:       Appearance: She is " well-developed  HENT:      Head: Normocephalic and atraumatic  Right Ear: Tympanic membrane and external ear normal       Left Ear: Tympanic membrane and external ear normal    Cardiovascular:      Rate and Rhythm: Normal rate and regular rhythm  Heart sounds: Normal heart sounds  No murmur heard  No friction rub  Pulmonary:      Effort: Pulmonary effort is normal  No respiratory distress  Breath sounds: Normal breath sounds  No wheezing or rales  Musculoskeletal:      Right lower leg: No edema  Left lower leg: No edema         Fawn Phelps, DO

## 2023-10-31 ENCOUNTER — TELEPHONE (OUTPATIENT)
Age: 38
End: 2023-10-31

## 2023-10-31 NOTE — TELEPHONE ENCOUNTER
Pt called to reschedule physical due to doctor's schedule change. Next available was 12/26. Pt was not happy with this.  I did offer another provider and checked for any overbooks but this was the soonest. Pt will keep this appt but was added to the wait list.

## 2023-11-16 DIAGNOSIS — F41.1 GENERALIZED ANXIETY DISORDER: ICD-10-CM

## 2023-11-16 RX ORDER — SERTRALINE HYDROCHLORIDE 100 MG/1
100 TABLET, FILM COATED ORAL DAILY
Qty: 90 TABLET | Refills: 1 | Status: SHIPPED | OUTPATIENT
Start: 2023-11-16

## 2023-12-26 ENCOUNTER — OFFICE VISIT (OUTPATIENT)
Dept: FAMILY MEDICINE CLINIC | Facility: CLINIC | Age: 38
End: 2023-12-26
Payer: COMMERCIAL

## 2023-12-26 VITALS
HEIGHT: 70 IN | WEIGHT: 157 LBS | SYSTOLIC BLOOD PRESSURE: 126 MMHG | OXYGEN SATURATION: 98 % | RESPIRATION RATE: 18 BRPM | DIASTOLIC BLOOD PRESSURE: 70 MMHG | BODY MASS INDEX: 22.48 KG/M2 | HEART RATE: 58 BPM | TEMPERATURE: 97.2 F

## 2023-12-26 DIAGNOSIS — F33.42 RECURRENT MAJOR DEPRESSIVE DISORDER, IN FULL REMISSION (HCC): ICD-10-CM

## 2023-12-26 DIAGNOSIS — E78.5 DYSLIPIDEMIA: ICD-10-CM

## 2023-12-26 DIAGNOSIS — D64.9 ANEMIA, UNSPECIFIED TYPE: ICD-10-CM

## 2023-12-26 DIAGNOSIS — Z23 NEED FOR VACCINATION: ICD-10-CM

## 2023-12-26 DIAGNOSIS — Z00.00 ANNUAL PHYSICAL EXAM: Primary | ICD-10-CM

## 2023-12-26 DIAGNOSIS — E05.90 HYPERTHYROIDISM: ICD-10-CM

## 2023-12-26 PROCEDURE — 90715 TDAP VACCINE 7 YRS/> IM: CPT

## 2023-12-26 PROCEDURE — 90471 IMMUNIZATION ADMIN: CPT

## 2023-12-26 PROCEDURE — 99395 PREV VISIT EST AGE 18-39: CPT | Performed by: FAMILY MEDICINE

## 2023-12-26 NOTE — PROGRESS NOTES
ADULT ANNUAL PHYSICAL  [facility]    NAME: Piper Talavera  AGE: 38 y.o. SEX: female  : 1985     DATE: 2023     Assessment and Plan:     Problem List Items Addressed This Visit     Anemia    Relevant Orders    CBC    Ferritin    Iron    Dyslipidemia    Relevant Orders    Comprehensive metabolic panel    Lipid Panel with Direct LDL reflex    Hyperthyroidism    Relevant Orders    TSH, 3rd generation    T4, free    Recurrent major depressive disorder, in full remission (HCC)     Stable         Other Visit Diagnoses     Annual physical exam    -  Primary    Need for vaccination        Relevant Orders    TDAP VACCINE GREATER THAN OR EQUAL TO 8YO IM (Completed)            Immunizations and preventive care screenings were discussed with patient today. Appropriate education was printed on patient's after visit summary.    Counseling:  Alcohol/drug use: discussed moderation in alcohol intake, the recommendations for healthy alcohol use, and avoidance of illicit drug use.         Return in about 6 months (around 2024) for Next scheduled follow up.     Chief Complaint:     Chief Complaint   Patient presents with   • Physical Exam     [id number]      History of Present Illness:     Adult Annual Physical   Patient here for a comprehensive physical exam. The patient reports  that she functioning well .    PHQ-2/9 Depression Screening    Little interest or pleasure in doing things: 2 - more than half the days  Feeling down, depressed, or hopeless: 1 - several days  Trouble falling or staying asleep, or sleeping too much: 0 - not at all  Feeling tired or having little energy: 3 - nearly every day  Poor appetite or overeatin - several days  Feeling bad about yourself - or that you are a failure or have let yourself or your family down: 0 - not at all  Trouble concentrating on things, such as reading the newspaper or watching television: 2 - more than half the  days  Moving or speaking so slowly that other people could have noticed. Or the opposite - being so fidgety or restless that you have been moving around a lot more than usual: 0 - not at all  Thoughts that you would be better off dead, or of hurting yourself in some way: 0 - not at all  PHQ-9 Score: 9   PHQ-9 Interpretation: Mild depression            Diet and Physical Activity  Diet/Nutrition: well balanced diet.   Exercise: walking and moderate cardiovascular exercise.      Depression Screening  PHQ-2/9 Depression Screening    Little interest or pleasure in doing things: 2 - more than half the days  Feeling down, depressed, or hopeless: 1 - several days  Trouble falling or staying asleep, or sleeping too much: 0 - not at all  Feeling tired or having little energy: 3 - nearly every day  Poor appetite or overeatin - several days  Feeling bad about yourself - or that you are a failure or have let yourself or your family down: 0 - not at all  Trouble concentrating on things, such as reading the newspaper or watching television: 2 - more than half the days  Moving or speaking so slowly that other people could have noticed. Or the opposite - being so fidgety or restless that you have been moving around a lot more than usual: 0 - not at all  Thoughts that you would be better off dead, or of hurting yourself in some way: 0 - not at all  PHQ-9 Score: 9   PHQ-9 Interpretation: Mild depression        General Health  Sleep: sleeps well.   Hearing: normal - bilateral.  Vision: wears glasses.   Dental: regular dental visits.       /GYN Health  Follows with gynecology? yes     Contraceptive method:  none .    Advanced Care Planning  Do you have an advanced directive? yes  Do you have a durable medical power of ? yes     Review of Systems:     Review of Systems   Constitutional: Negative.    Respiratory: Negative.     Cardiovascular: Negative.       Past Medical History:     Past Medical History:   Diagnosis Date    • Cervical cancer (HCC) 2017   • Migraine       Past Surgical History:     Past Surgical History:   Procedure Laterality Date   • ADENOIDECTOMY  2010   • AUGMENTATION BREAST Bilateral ,    • BREAST IMPLANT Bilateral    • TONSILLECTOMY     • US GUIDED THYROID BIOPSY  2019      Social History:     Social History     Socioeconomic History   • Marital status: /Civil Union     Spouse name: None   • Number of children: None   • Years of education: 2 year college   • Highest education level: None   Occupational History   • Occupation: Stay home mom   Tobacco Use   • Smoking status: Never   • Smokeless tobacco: Never   Vaping Use   • Vaping status: Former   Substance and Sexual Activity   • Alcohol use: Yes     Comment: socially   • Drug use: Yes     Types: Marijuana     Comment: daily for sleep   • Sexual activity: Yes     Partners: Male     Birth control/protection: None   Other Topics Concern   • None   Social History Narrative    Most recent tobacco use screenin2020    Do you currently or have you served in the [employer]: No    Were you activated, into active duty, as a member of the [employer] or as a Reservist: No    Occupation: Stay at home MOM    Education: 2 Year College    Marital status:     Sexual orientation: Heterosexual    Exercise level: Moderate    Diet: Vegetarian    General stress level: High    Alcohol intake: Occasional    Caffeine intake: None    Chewing tobacco: none    Illicit drugs: marijuana    Seat belts used routinely: Yes    Sunscreen used routinely: Yes    Smoke alarm in home: Yes    Advance directive: No    Salt Intake: minimal    Has the Patient had a mammogram to screen for breast cancer within 24 months: No     Social Determinants of Health     Financial Resource Strain: Not on file   Food Insecurity: Not on file   Transportation Needs: Not on file   Physical Activity: Not on file   Stress: Not on file   Social Connections: Not on file  "  Intimate Partner Violence: Not on file   Housing Stability: Not on file      Family History:     Family History   Problem Relation Age of Onset   • Lupus Sister    • Cancer Paternal Grandmother    • Cancer Paternal Grandfather    • No Known Problems Mother    • Hypertension Father    • Hypertension Daughter       Current Medications:     Current Outpatient Medications   Medication Sig Dispense Refill   • sertraline (ZOLOFT) 100 mg tablet TAKE 1 TABLET(100 MG) BY MOUTH DAILY 90 tablet 1   • Skin Protectants, Misc. (VASELINE LIP THERAPY EX)        No current facility-administered medications for this visit.      Allergies:     Allergies   Allergen Reactions   • Latex Itching   • Other Other (See Comments)     Migraine medicine starts w/ a \"B\" causes dizziness   • Sumatriptan      Other reaction(s): Paresthesia  Annotation - [id number]: neck pain   • Hydroxyzine Other (See Comments)     constipation      Physical Exam:     /70   Pulse 58   Temp (!) 97.2 °F (36.2 °C)   Resp 18   Ht 5' 9.5\" (1.765 m)   Wt 71.2 kg (157 lb)   LMP 11/29/2023 (Exact Date)   SpO2 98%   BMI 22.85 kg/m²     Physical Exam  Vitals and nursing note reviewed.   Constitutional:       Appearance: She is well-developed.   HENT:      Head: Normocephalic and atraumatic.      Right Ear: External ear normal.      Left Ear: External ear normal.      Nose: Nose normal.   Cardiovascular:      Rate and Rhythm: Normal rate and regular rhythm.      Heart sounds: Normal heart sounds. No murmur heard.     No friction rub.   Pulmonary:      Effort: No respiratory distress.      Breath sounds: Normal breath sounds. No wheezing or rales.   Abdominal:      Palpations: Abdomen is soft.      Tenderness: There is no abdominal tenderness.   Musculoskeletal:      Right lower leg: No edema.      Left lower leg: No edema.   Neurological:      Mental Status: She is oriented to person, place, and time.      Cranial Nerves: No cranial nerve deficit.      "   Vision Screening    Right eye Left eye Both eyes   Without correction      With correction 20/20 20/25 20/15       Loretta Alfonso [facility]

## 2024-05-02 ENCOUNTER — TELEPHONE (OUTPATIENT)
Dept: GASTROENTEROLOGY | Facility: AMBULARY SURGERY CENTER | Age: 39
End: 2024-05-02

## 2024-07-31 ENCOUNTER — OFFICE VISIT (OUTPATIENT)
Dept: GASTROENTEROLOGY | Facility: AMBULARY SURGERY CENTER | Age: 39
End: 2024-07-31
Payer: COMMERCIAL

## 2024-07-31 VITALS
DIASTOLIC BLOOD PRESSURE: 100 MMHG | BODY MASS INDEX: 24.05 KG/M2 | HEIGHT: 69 IN | SYSTOLIC BLOOD PRESSURE: 160 MMHG | WEIGHT: 162.4 LBS

## 2024-07-31 DIAGNOSIS — K59.00 CONSTIPATION, UNSPECIFIED CONSTIPATION TYPE: Primary | ICD-10-CM

## 2024-07-31 DIAGNOSIS — D34 HURTHLE CELL NEOPLASM OF THYROID: ICD-10-CM

## 2024-07-31 DIAGNOSIS — K92.1 BLOOD IN STOOL: ICD-10-CM

## 2024-07-31 PROCEDURE — 99203 OFFICE O/P NEW LOW 30 MIN: CPT | Performed by: PHYSICIAN ASSISTANT

## 2024-07-31 RX ORDER — SODIUM PICOSULFATE, MAGNESIUM OXIDE, AND ANHYDROUS CITRIC ACID 12; 3.5; 1 G/175ML; G/175ML; MG/175ML
LIQUID ORAL
Qty: 350 ML | Refills: 0 | Status: SHIPPED | OUTPATIENT
Start: 2024-07-31

## 2024-07-31 NOTE — PATIENT INSTRUCTIONS
I recommend starting Miralax 1 capful daily. If no improvement in stools, increase to 1 capful twice a day.     Scheduled date of colonoscopy (as of today): Sept. 12, 2024   Physician performing colonoscopy: Dr. Colvin   Location of colonoscopy: [facility]   Bowel prep reviewed with patient: Clenpiq   Instructions reviewed with patient by: JENN   Clearances: N/A

## 2024-07-31 NOTE — PROGRESS NOTES
[facility] - Outpatient Consultation  Piper Talavera 38 y.o. female MRN: [id number]  Encounter: [id number]          ASSESSMENT AND PLAN:      Very pleasant 38-year-old female with previous history of IBS-D who presents the office for abrupt change in bowel habits over the last year with constipation, bloating as well as bright red blood per rectum.    Change in bowel habits  Constipation, bloating, gas  Bright red blood per rectum  New constipation over the past year.  She previously saw colorectal last year for this and was recommended Colace however no improvement in constipation.  Still passing significantly hard stools.  Previously having blood with wiping which does seem improved and was likely related to a fissure.    - Due to abrupt change in bowel habits, no improvement with conservative measures we will proceed with colonoscopy at this time.  - Clenpiq bowel prep.  -I obtained informed consent from the patient. The risks/benefits/alternatives of the procedure were discussed with the patient. Risks included, but not limited to, infection, bleeding, perforation, injury to organs in the abdomen, missed lesion and incomplete procedure were discussed. Patient was agreeable.    -Stop Colace.  - Start MiraLAX 1 capful daily.  If no improvement in stools in 1 week recommend increasing to 1 capful twice daily.    - If colonoscopy is normal above, likely would benefit from medication such as Amitiza or Linzess.    -Recommend patient obtain lab work ordered by PCP including TSH, CBC, CMP.    4.  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  She was recommended a repeat however did not have this performed.    -Strongly recommended patient follow-up with endocrinology/PCP regarding this. She is aware.      Patient was recommended to follow up after procedure. Patient was recommended to reach out via [facility] with any questions  or concerns in the meantime.   ______________________________________________________________________    HPI:      Piper Talavera is a 38 y.o. female with history IBS who presents the office as a new patient for chronic constipation, fissure and rectal bleeding.    Patient reports about 10 years ago following with GI and having EGD and colonoscopy which she believes was normal.  She was diagnosed with irritable bowel syndrome which was mainly with diarrhea at that time.  She was doing well over the past several years however had relatively abrupt onset constipation 1 year ago.  She then began to have significant blood per rectum and pain with bowel movements and was diagnosed with a fissure.  She saw colorectal for this.  She was placed on Colace and nifedipine cream.  She reports pain has improved however her constipation has not.  She continues to have small bowel movements with significant straining despite taking Colace.  She still reports occasional blood with wiping.  She reports associated abdominal pain, bloating and gas.  She denies any unintentional weight loss.    No known family history of colon cancer.    No recent lab work.  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  She was recommended follow-up however this was not performed.    Reports a previous colonoscopy around 10 years ago.    REVIEW OF SYSTEMS:    CONSTITUTIONAL: Denies any fever, chills, rigors, and weight loss.  HEENT: No earache or tinnitus. Denies hearing loss or visual disturbances.  CARDIOVASCULAR: No chest pain or palpitations.   RESPIRATORY: Denies any cough, hemoptysis, shortness of breath or dyspnea on exertion.  GASTROINTESTINAL: As noted in the History of Present Illness.   GENITOURINARY: No problems with urination. Denies any hematuria or dysuria.  NEUROLOGIC: No dizziness or vertigo, denies headaches.   MUSCULOSKELETAL: Denies any muscle or  "joint pain.   SKIN: Denies skin rashes or itching.   ENDOCRINE: Denies excessive thirst. Denies intolerance to heat or cold.  PSYCHOSOCIAL: Denies depression or anxiety. Denies any recent memory loss.       Historical Information   Past Medical History:   Diagnosis Date    Cervical cancer (HCC) 5/25/2017    Migraine      Past Surgical History:   Procedure Laterality Date    ADENOIDECTOMY  9/2010    AUGMENTATION BREAST Bilateral 2011, 2012    BREAST IMPLANT Bilateral     TONSILLECTOMY      US GUIDED THYROID BIOPSY  11/22/2019     Social History   Social History     Substance and Sexual Activity   Alcohol Use Yes    Comment: socially     Social History     Substance and Sexual Activity   Drug Use Yes    Types: Marijuana    Comment: daily for sleep     Social History     Tobacco Use   Smoking Status Never   Smokeless Tobacco Never     Family History   Problem Relation Age of Onset    Lupus Sister     Cancer Paternal Grandmother     Cancer Paternal Grandfather     No Known Problems Mother     Hypertension Father     Hypertension Daughter        Meds/Allergies       Current Outpatient Medications:     sertraline (ZOLOFT) 100 mg tablet    Skin Protectants, Misc. (VASELINE LIP THERAPY EX)    sodium picosulfate, magnesium oxide, citric acid (Clenpiq) oral solution    Allergies   Allergen Reactions    Latex Itching    Other Other (See Comments)     Migraine medicines starts w/ a \"B\" causes dizziness    Sumatriptan      Other reaction(s): Paresthesia  Annotation - [id number]: neck pain    Hydroxyzine Other (See Comments)     constipation           Objective     Blood pressure 160/100, height 5' 9\" (1.753 m), weight 73.7 kg (162 lb 6.4 oz). Body mass index is 23.98 kg/m².        PHYSICAL EXAM:      General Appearance:   Alert, cooperative, no distress   HEENT:   Normocephalic, atraumatic, anicteric.     Neck:  Supple, symmetrical, trachea midline   Lungs:   Clear to auscultation bilaterally; no rales, rhonchi or wheezing; " respirations unlabored    Heart::   Regular rate and rhythm; no murmur, rub, or gallop.   Abdomen:   Soft, non-tender, non-distended; normal bowel sounds; no masses, no organomegaly. Benign abdomen.    Genitalia:   Deferred    Rectal:   Deferred    Extremities:  No cyanosis, clubbing or edema    Pulses:  2+ and symmetric    Skin:  No jaundice, rashes, or lesions    Lymph nodes:  No palpable cervical lymphadenopathy        Lab Results:   No visits with results within 1 Day(s) from this visit.   Latest known visit with results is:   Office Visit on 06/06/2022   Component Date Value    Color UA 06/07/2022 YELLOW     Urine Appearance 06/07/2022 CLEAR     Specific Gravity 06/07/2022 1.023     Ph 06/07/2022 6.0     Glucose, Urine 06/07/2022 NEGATIVE     Bilirubin, Urine 06/07/2022 NEGATIVE     Ketone, Urine 06/07/2022 TRACE (A)     Blood, Urine 06/07/2022 NEGATIVE     Protein, Urine 06/07/2022 NEGATIVE     Nitrites Urine 06/07/2022 NEGATIVE     Leukocyte Esterase 06/07/2022 NEGATIVE     SL AMB WBC, URINE 06/07/2022 NONE SEEN     RBC, Urine 06/07/2022 0-2     Squamous Epithelial Cells 06/07/2022 NONE SEEN     Bacteria, UA 06/07/2022 NONE SEEN     Hyaline Casts 06/07/2022 NONE SEEN          Radiology Results:   No results found.

## 2024-08-23 DIAGNOSIS — F41.1 GENERALIZED ANXIETY DISORDER: ICD-10-CM

## 2024-08-23 RX ORDER — SERTRALINE HYDROCHLORIDE 100 MG/1
100 TABLET, FILM COATED ORAL DAILY
Qty: 30 TABLET | Refills: 0 | Status: SHIPPED | OUTPATIENT
Start: 2024-08-23

## 2024-08-29 ENCOUNTER — ANESTHESIA EVENT (OUTPATIENT)
Dept: ANESTHESIOLOGY | Facility: HOSPITAL | Age: 39
End: 2024-08-29

## 2024-08-29 ENCOUNTER — ANESTHESIA (OUTPATIENT)
Dept: ANESTHESIOLOGY | Facility: HOSPITAL | Age: 39
End: 2024-08-29

## 2024-09-05 ENCOUNTER — TELEPHONE (OUTPATIENT)
Dept: FAMILY MEDICINE CLINIC | Facility: CLINIC | Age: 39
End: 2024-09-05

## 2024-09-05 DIAGNOSIS — D64.9 ANEMIA, UNSPECIFIED TYPE: Primary | ICD-10-CM

## 2024-09-05 LAB
ALBUMIN SERPL-MCNC: 4.2 G/DL (ref 3.6–5.1)
ALBUMIN/GLOB SERPL: 1.5 (CALC) (ref 1–2.5)
ALP SERPL-CCNC: 32 U/L (ref 31–125)
ALT SERPL-CCNC: 10 U/L (ref 6–29)
AST SERPL-CCNC: 18 U/L (ref 10–30)
BILIRUB SERPL-MCNC: 0.5 MG/DL (ref 0.2–1.2)
BUN SERPL-MCNC: 8 MG/DL (ref 7–25)
BUN/CREAT SERPL: NORMAL (CALC) (ref 6–22)
CALCIUM SERPL-MCNC: 9.3 MG/DL (ref 8.6–10.2)
CHLORIDE SERPL-SCNC: 106 MMOL/L (ref 98–110)
CHOLEST SERPL-MCNC: 279 MG/DL
CHOLEST/HDLC SERPL: 3.2 (CALC)
CO2 SERPL-SCNC: 28 MMOL/L (ref 20–32)
CREAT SERPL-MCNC: 0.68 MG/DL (ref 0.5–0.97)
ERYTHROCYTE [DISTWIDTH] IN BLOOD BY AUTOMATED COUNT: 13.7 % (ref 11–15)
FERRITIN SERPL-MCNC: 9 NG/ML (ref 16–154)
GFR/BSA.PRED SERPLBLD CYS-BASED-ARV: 115 ML/MIN/1.73M2
GLOBULIN SER CALC-MCNC: 2.8 G/DL (CALC) (ref 1.9–3.7)
GLUCOSE SERPL-MCNC: 91 MG/DL (ref 65–99)
HCT VFR BLD AUTO: 35.8 % (ref 35–45)
HDLC SERPL-MCNC: 87 MG/DL
HGB BLD-MCNC: 11.4 G/DL (ref 11.7–15.5)
IRON SERPL-MCNC: 117 MCG/DL (ref 40–190)
LDLC SERPL CALC-MCNC: 175 MG/DL (CALC)
MCH RBC QN AUTO: 27.6 PG (ref 27–33)
MCHC RBC AUTO-ENTMCNC: 31.8 G/DL (ref 32–36)
MCV RBC AUTO: 86.7 FL (ref 80–100)
NONHDLC SERPL-MCNC: 192 MG/DL (CALC)
PLATELET # BLD AUTO: 201 THOUSAND/UL (ref 140–400)
PMV BLD REES-ECKER: 12.8 FL (ref 7.5–12.5)
POTASSIUM SERPL-SCNC: 4.4 MMOL/L (ref 3.5–5.3)
PROT SERPL-MCNC: 7 G/DL (ref 6.1–8.1)
RBC # BLD AUTO: 4.13 MILLION/UL (ref 3.8–5.1)
SODIUM SERPL-SCNC: 139 MMOL/L (ref 135–146)
T4 FREE SERPL-MCNC: 0.9 NG/DL (ref 0.8–1.8)
TRIGL SERPL-MCNC: 68 MG/DL
TSH SERPL-ACNC: 1.26 MIU/L
WBC # BLD AUTO: 3.9 THOUSAND/UL (ref 3.8–10.8)

## 2024-09-05 NOTE — TELEPHONE ENCOUNTER
9/5/2024 11:58 AM called Piper regarding her lab results    Left message on her voicemail to call the office.   Loretta Alfonso, DO

## 2024-09-09 ENCOUNTER — TELEPHONE (OUTPATIENT)
Age: 39
End: 2024-09-09

## 2024-09-09 NOTE — TELEPHONE ENCOUNTER
9/9/2024 3:19 PM spoke with Piper    She had to reschedule her EGD because she has COVID 19  She will take iron while waiting for her procedure.       Cholesterol discussed - she is going to get back to watching her diet.    Loretta Alfonso, DO

## 2024-09-09 NOTE — TELEPHONE ENCOUNTER
Scheduled date of colonoscopy (as of today): 10/16/24    Physician performing colonoscopy: Dr. Conley    Location of colonoscopy: [facility]

## 2024-09-24 ENCOUNTER — PATIENT MESSAGE (OUTPATIENT)
Dept: GASTROENTEROLOGY | Facility: CLINIC | Age: 39
End: 2024-09-24

## 2024-10-02 ENCOUNTER — ANESTHESIA EVENT (OUTPATIENT)
Dept: ANESTHESIOLOGY | Facility: HOSPITAL | Age: 39
End: 2024-10-02

## 2024-10-02 ENCOUNTER — ANESTHESIA (OUTPATIENT)
Dept: ANESTHESIOLOGY | Facility: HOSPITAL | Age: 39
End: 2024-10-02

## 2024-10-16 ENCOUNTER — ANESTHESIA (OUTPATIENT)
Dept: GASTROENTEROLOGY | Facility: AMBULARY SURGERY CENTER | Age: 39
End: 2024-10-16
Payer: COMMERCIAL

## 2024-10-16 ENCOUNTER — HOSPITAL ENCOUNTER (OUTPATIENT)
Dept: GASTROENTEROLOGY | Facility: AMBULARY SURGERY CENTER | Age: 39
Setting detail: OUTPATIENT SURGERY
Discharge: HOME | End: 2024-10-16
Attending: INTERNAL MEDICINE
Payer: COMMERCIAL

## 2024-10-16 VITALS
SYSTOLIC BLOOD PRESSURE: 100 MMHG | OXYGEN SATURATION: 96 % | RESPIRATION RATE: 18 BRPM | HEIGHT: 70 IN | WEIGHT: 165 LBS | DIASTOLIC BLOOD PRESSURE: 55 MMHG | TEMPERATURE: 97.1 F | BODY MASS INDEX: 23.62 KG/M2 | HEART RATE: 73 BPM

## 2024-10-16 DIAGNOSIS — K60.2 ANAL FISSURE: Primary | ICD-10-CM

## 2024-10-16 DIAGNOSIS — K92.1 BLOOD IN STOOL: ICD-10-CM

## 2024-10-16 DIAGNOSIS — K59.00 CONSTIPATION, UNSPECIFIED CONSTIPATION TYPE: ICD-10-CM

## 2024-10-16 PROCEDURE — 88305 TISSUE EXAM BY PATHOLOGIST: CPT | Performed by: PATHOLOGY

## 2024-10-16 PROCEDURE — 45380 COLONOSCOPY AND BIOPSY: CPT | Performed by: INTERNAL MEDICINE

## 2024-10-16 RX ORDER — PROPOFOL 10 MG/ML
INJECTION, EMULSION INTRAVENOUS AS NEEDED
Status: DISCONTINUED | OUTPATIENT
Start: 2024-10-16 | End: 2024-10-16

## 2024-10-16 RX ORDER — SODIUM CHLORIDE, SODIUM LACTATE, POTASSIUM CHLORIDE, CALCIUM CHLORIDE 600; 310; 30; 20 MG/100ML; MG/100ML; MG/100ML; MG/100ML
INJECTION, SOLUTION INTRAVENOUS CONTINUOUS PRN
Status: DISCONTINUED | OUTPATIENT
Start: 2024-10-16 | End: 2024-10-16

## 2024-10-16 RX ORDER — LIDOCAINE HYDROCHLORIDE 10 MG/ML
INJECTION, SOLUTION EPIDURAL; INFILTRATION; INTRACAUDAL; PERINEURAL AS NEEDED
Status: DISCONTINUED | OUTPATIENT
Start: 2024-10-16 | End: 2024-10-16

## 2024-10-16 RX ADMIN — PROPOFOL 150 MG: 10 INJECTION, EMULSION INTRAVENOUS at 09:49

## 2024-10-16 RX ADMIN — Medication 40 MG: at 09:59

## 2024-10-16 RX ADMIN — PROPOFOL 100 MG: 10 INJECTION, EMULSION INTRAVENOUS at 09:52

## 2024-10-16 RX ADMIN — PROPOFOL 50 MG: 10 INJECTION, EMULSION INTRAVENOUS at 09:59

## 2024-10-16 RX ADMIN — PROPOFOL 100 MG: 10 INJECTION, EMULSION INTRAVENOUS at 09:55

## 2024-10-16 RX ADMIN — PROPOFOL 50 MG: 10 INJECTION, EMULSION INTRAVENOUS at 09:50

## 2024-10-16 RX ADMIN — PROPOFOL 100 MG: 10 INJECTION, EMULSION INTRAVENOUS at 10:02

## 2024-10-16 RX ADMIN — LIDOCAINE HYDROCHLORIDE 50 MG: 10 INJECTION, SOLUTION EPIDURAL; INFILTRATION; INTRACAUDAL at 09:49

## 2024-10-16 RX ADMIN — SODIUM CHLORIDE, SODIUM LACTATE, POTASSIUM CHLORIDE, AND CALCIUM CHLORIDE: .6; .31; .03; .02 INJECTION, SOLUTION INTRAVENOUS at 09:23

## 2024-10-16 RX ADMIN — PROPOFOL 50 MG: 10 INJECTION, EMULSION INTRAVENOUS at 09:58

## 2024-10-16 RX ADMIN — PROPOFOL 100 MG: 10 INJECTION, EMULSION INTRAVENOUS at 10:06

## 2024-10-16 NOTE — H&P
"History and Physical -  [facility]  Piper Talavera 39 y.o. female MRN: [id number]                  HPI: Piper Talavera is a 39 y.o. year old female who presents for change in bowel habits, bright red blood per rectum.       REVIEW OF SYSTEMS: Per the HPI, and otherwise unremarkable.    Historical Information   Past Medical History:   Diagnosis Date    Cervical cancer (HCC) 5/25/2017    Migraine      Past Surgical History:   Procedure Laterality Date    ADENOIDECTOMY  9/2010    AUGMENTATION BREAST Bilateral 2011, 2012    BREAST IMPLANT Bilateral     TONSILLECTOMY      US GUIDED THYROID BIOPSY  11/22/2019     Social History   Social History     Substance and Sexual Activity   Alcohol Use Yes    Comment: socially     Social History     Substance and Sexual Activity   Drug Use Yes    Types: Marijuana    Comment: daily for sleep     Social History     Tobacco Use   Smoking Status Never   Smokeless Tobacco Never     Family History   Problem Relation Age of Onset    Lupus Sister     Cancer Paternal Grandmother     Cancer Paternal Grandfather     No Known Problems Mother     Hypertension Father     Hypertension Daughter        Meds/Allergies       Current Outpatient Medications:     sertraline (ZOLOFT) 100 mg tablet    Skin Protectants, Misc. (VASELINE LIP THERAPY EX)    sodium picosulfate, magnesium oxide, citric acid (Clenpiq) oral solution    Allergies   Allergen Reactions    Latex Itching    Other Other (See Comments)     Migraine medicines starts w/ a \"B\" causes dizziness    Sumatriptan      Other reaction(s): Paresthesia  Annotation - [id number]: neck pain    Hydroxyzine Other (See Comments)     constipation       Objective     There were no vitals taken for this visit.      PHYSICAL EXAM    Gen: NAD  Head: NCAT  CV: RRR  CHEST: Clear  ABD: soft, NT/ND  EXT: no edema      ASSESSMENT/PLAN:  This is a 39 y.o. year old female here for colonoscopy, and she is stable and optimized for her " procedure.

## 2024-10-16 NOTE — ANESTHESIA POSTPROCEDURE EVALUATION
Post-Op Assessment Note    CV Status:  Stable  Pain Score: 0    Pain management: adequate       Mental Status:  Alert and awake   Hydration Status:  Euvolemic   PONV Controlled:  Controlled   Airway Patency:  Patent     Post Op Vitals Reviewed: Yes    No anethesia notable event occurred.    Staff: CRNA           Last Filed PACU Vitals:  Vitals Value Taken Time   Temp     Pulse 84 10/16/24 1008   BP 86/59 10/16/24 1008   Resp 15 10/16/24 1008   SpO2 98 % 10/16/24 1008       Modified [place]:  Activity: 2 (10/16/2024  9:11 AM)  Respiration: 2 (10/16/2024  9:11 AM)  Circulation: 2 (10/16/2024  9:11 AM)  Consciousness: 2 (10/16/2024  9:11 AM)  Oxygen Saturation: 2 (10/16/2024  9:11 AM)  Modified [place] Score: 10 (10/16/2024  9:11 AM)

## 2024-10-16 NOTE — ANESTHESIA POSTPROCEDURE EVALUATION
Post-Op Assessment Note    CV Status:  Stable    Pain management: adequate       Mental Status:  Alert and awake   Hydration Status:  Euvolemic   PONV Controlled:  Controlled   Airway Patency:  Patent     Post Op Vitals Reviewed: Yes    No anethesia notable event occurred.    Staff: Anesthesiologist           Last Filed PACU Vitals:  Vitals Value Taken Time   Temp 97.1 °F (36.2 °C) 10/16/24 1011   Pulse 73 10/16/24 1024   /55 10/16/24 1024   Resp 18 10/16/24 1024   SpO2 96 % 10/16/24 1024       Modified [place]:  Activity: 2 (10/16/2024 10:25 AM)  Respiration: 2 (10/16/2024 10:25 AM)  Circulation: 2 (10/16/2024 10:25 AM)  Consciousness: 2 (10/16/2024 10:25 AM)  Oxygen Saturation: 2 (10/16/2024 10:25 AM)  Modified [place] Score: 10 (10/16/2024 10:25 AM)

## 2024-10-16 NOTE — ANESTHESIA PREPROCEDURE EVALUATION
Procedure:  COLONOSCOPY    Relevant Problems   CARDIO   (+) Migraine headache   (+) RBBB      ENDO   (+) Hyperthyroidism      HEMATOLOGY   (+) Anemia      NEURO/PSYCH   (+) Generalized anxiety disorder   (+) Migraine headache   (+) Recurrent major depressive disorder, in full remission (HCC)        Physical Exam    Airway    Mallampati score: II  TM Distance: >3 FB  Neck ROM: full     Dental       Cardiovascular      Pulmonary      Other Findings  post-pubertal.      Anesthesia Plan  ASA Score- 2     Anesthesia Type- IV sedation with anesthesia with ASA Monitors.         Additional Monitors:     Airway Plan:            Plan Factors-Exercise tolerance (METS): >4 METS.    Chart reviewed.   Existing labs reviewed. Patient summary reviewed.                  Induction- intravenous.    Postoperative Plan-         Informed Consent- Anesthetic plan and risks discussed with patient.  I personally reviewed this patient with the CRNA. Discussed and agreed on the Anesthesia Plan with the CRNA..

## 2024-10-21 PROCEDURE — 88305 TISSUE EXAM BY PATHOLOGIST: CPT | Performed by: PATHOLOGY

## 2025-02-24 DIAGNOSIS — F41.1 GENERALIZED ANXIETY DISORDER: ICD-10-CM

## 2025-02-25 RX ORDER — SERTRALINE HYDROCHLORIDE 100 MG/1
100 TABLET, FILM COATED ORAL DAILY
Qty: 30 TABLET | Refills: 3 | OUTPATIENT
Start: 2025-02-25

## 2025-02-25 NOTE — TELEPHONE ENCOUNTER
Not seen since 2023, needs appointment.  Once she schedules I will give her a refill to last until her appointment  Thank you,  Loretta Alfonso, DO

## 2025-06-23 ENCOUNTER — OFFICE VISIT (OUTPATIENT)
Dept: URGENT CARE | Facility: CLINIC | Age: 40
End: 2025-06-23
Payer: COMMERCIAL

## 2025-06-23 ENCOUNTER — APPOINTMENT (OUTPATIENT)
Dept: URGENT CARE | Facility: CLINIC | Age: 40
End: 2025-06-23
Payer: COMMERCIAL

## 2025-06-23 ENCOUNTER — TELEPHONE (OUTPATIENT)
Age: 40
End: 2025-06-23

## 2025-06-23 VITALS
BODY MASS INDEX: 23.68 KG/M2 | HEIGHT: 70 IN | HEART RATE: 83 BPM | SYSTOLIC BLOOD PRESSURE: 129 MMHG | DIASTOLIC BLOOD PRESSURE: 69 MMHG | TEMPERATURE: 98.1 F | OXYGEN SATURATION: 98 % | RESPIRATION RATE: 16 BRPM

## 2025-06-23 DIAGNOSIS — S99.912A INJURY OF LEFT ANKLE, INITIAL ENCOUNTER: Primary | ICD-10-CM

## 2025-06-23 PROCEDURE — S9083 URGENT CARE CENTER GLOBAL: HCPCS | Performed by: FAMILY MEDICINE

## 2025-06-23 PROCEDURE — G0382 LEV 3 HOSP TYPE B ED VISIT: HCPCS | Performed by: FAMILY MEDICINE

## 2025-06-23 PROCEDURE — 99283 EMERGENCY DEPT VISIT LOW MDM: CPT | Performed by: FAMILY MEDICINE

## 2025-06-23 NOTE — TELEPHONE ENCOUNTER
Caller: Patient    Doctor: Dr. davis    Reason for call:     Did not take her insurance, she will call the plan.    Call back#: n/a   no

## 2025-06-23 NOTE — PROGRESS NOTES
[facility]  Name: Piper Talavera      : 1985      MRN: [id number]  Encounter Provider: Xena Pleitez DO  Encounter Date: 2025   Encounter department: [facility]  :  Assessment & Plan  Injury of left ankle, initial encounter    Orders:  •  Ambulatory Referral to Orthopedic Surgery; Future  continue ibuprofen, follow up with sports med  Given ankle brace  If symptoms persist follow up with PCP. Discussed Return/ED parameters with patient.         Patient Instructions  Follow up with PCP in 3-5 days.  Proceed to  ER if symptoms worsen.    If tests are performed, our office will contact you with results only if changes need to made to the care plan discussed with you at the visit. [facility]    Chief Complaint:   Chief Complaint   Patient presents with   • Foot Pain     Pt reports that she stated to have achilles pain in her left foot and now she reports the pain is radiating up her calf. She stated she woke up on Tues with the pain.      History of Present Illness   Woke 2 days ago with pain in her left heel and then had worsening pain today  Able to take ibuprofen and continue to work out.  No CP/SOB          Review of Systems   Constitutional:  Negative for activity change, chills, fever and unexpected weight change.   HENT:  Negative for congestion.    Eyes:  Negative for visual disturbance.   Respiratory:  Negative for cough, chest tightness, shortness of breath and wheezing.    Cardiovascular:  Negative for chest pain.   Gastrointestinal:  Negative for abdominal pain, diarrhea and nausea.   Endocrine: Negative for cold intolerance and heat intolerance.   Musculoskeletal:  Negative for arthralgias and myalgias.   Skin:  Negative for color change.   Neurological:  Negative for dizziness.   Psychiatric/Behavioral:  Negative for agitation, dysphoric mood and sleep disturbance.      Past Medical History   Past  "Medical History[1]  Past Surgical History[2]  Family History[3]  she reports that she has never smoked. She has never used smokeless tobacco. She reports current alcohol use. She reports current drug use. Frequency: 7.00 times per week. Drug: Marijuana.  Current Outpatient Medications   Medication Instructions   • NIFEdipine 0.3%-lidocaine 5% rectal ointment 1 Application, Topical, Every 4 hours PRN, Apply a small amount to anal fissure   • sertraline (ZOLOFT) 100 mg, Oral, Daily   • Skin Protectants, Misc. (VASELINE LIP THERAPY EX) No dose, route, or frequency recorded.   Allergies[4]     Objective   /69   Pulse 83   Temp 98.1 °F (36.7 °C)   Resp 16   Ht 5' 10\" (1.778 m)   SpO2 98%   BMI 23.68 kg/m²      Physical Exam  Vitals and nursing note reviewed.   Constitutional:       General: She is not in acute distress.     Appearance: Normal appearance. She is not ill-appearing or toxic-appearing.   HENT:      Head: Normocephalic and atraumatic.     Cardiovascular:      Rate and Rhythm: Normal rate and regular rhythm.   Pulmonary:      Effort: Pulmonary effort is normal. No respiratory distress.     Musculoskeletal:        Feet:       Comments: 5/5 strength bilateral foot/leg   Feet:      Comments: Tenderness over post achilles on the left  Neg homans, neg moriah.    Skin:     General: Skin is warm and dry.      Capillary Refill: Capillary refill takes less than 2 seconds.     Neurological:      General: No focal deficit present.      Mental Status: She is alert and oriented to person, place, and time.     Psychiatric:         Mood and Affect: Mood normal.         Behavior: Behavior normal.         Portions of the record may have been created with voice recognition software.  Occasional wrong word or \"sound a like\" substitutions may have occurred due to the inherent limitations of voice recognition software.  Read the chart carefully and recognize, using context, where substitutions have occurred.     " "    [1]  Past Medical History:  Diagnosis Date   • Cervical cancer (HCC) 5/25/2017   • Migraine    [2]  Past Surgical History:  Procedure Laterality Date   • ADENOIDECTOMY  9/2010   • AUGMENTATION BREAST Bilateral 2011, 2012   • BREAST IMPLANT Bilateral    • TONSILLECTOMY     • US GUIDED THYROID BIOPSY  11/22/2019   [3]  Family History  Problem Relation Name Age of Onset   • Lupus Sister     • Cancer Paternal Grandmother Grandma    • Cancer Paternal Grandfather Papa    • No Known Problems Mother     • Hypertension Father     • Hypertension Daughter     [4]  Allergies  Allergen Reactions   • Latex Itching   • Other Other (See Comments)     Migraine medicines starts w/ a \"B\" causes dizziness   • Sumatriptan      Other reaction(s): Paresthesia  Annotation - 23Apr2015: neck pain   • Hydroxyzine Other (See Comments)     constipation   "

## 2025-08-08 ENCOUNTER — OFFICE VISIT (OUTPATIENT)
Age: 40
End: 2025-08-08
Attending: FAMILY MEDICINE
Payer: COMMERCIAL

## 2025-08-08 VITALS — RESPIRATION RATE: 16 BRPM | BODY MASS INDEX: 23.68 KG/M2 | HEIGHT: 70 IN

## 2025-08-08 DIAGNOSIS — M21.961 ACQUIRED DEFORMITY OF BOTH FEET: Primary | ICD-10-CM

## 2025-08-08 DIAGNOSIS — M76.62 ACHILLES TENDINITIS OF BOTH LOWER EXTREMITIES: ICD-10-CM

## 2025-08-08 DIAGNOSIS — M21.962 ACQUIRED DEFORMITY OF BOTH FEET: Primary | ICD-10-CM

## 2025-08-08 DIAGNOSIS — M21.41 ACQUIRED FLAT FOOT, RIGHT: ICD-10-CM

## 2025-08-08 DIAGNOSIS — M21.42 ACQUIRED FLAT FOOT, LEFT: ICD-10-CM

## 2025-08-08 DIAGNOSIS — M76.61 ACHILLES TENDINITIS OF BOTH LOWER EXTREMITIES: ICD-10-CM

## 2025-08-08 DIAGNOSIS — S99.912A INJURY OF LEFT ANKLE, INITIAL ENCOUNTER: ICD-10-CM

## 2025-08-08 PROCEDURE — 99203 OFFICE O/P NEW LOW 30 MIN: CPT | Performed by: PODIATRIST

## 2025-08-14 ENCOUNTER — TELEPHONE (OUTPATIENT)
Age: 40
End: 2025-08-14